# Patient Record
Sex: FEMALE | Race: WHITE | Employment: FULL TIME | URBAN - NONMETROPOLITAN AREA
[De-identification: names, ages, dates, MRNs, and addresses within clinical notes are randomized per-mention and may not be internally consistent; named-entity substitution may affect disease eponyms.]

---
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
## 2021-03-24 ENCOUNTER — OFFICE VISIT (OUTPATIENT)
Dept: FAMILY MEDICINE CLINIC | Age: 21
End: 2021-03-24
Payer: COMMERCIAL

## 2021-03-24 ENCOUNTER — HOSPITAL ENCOUNTER (EMERGENCY)
Age: 21
Discharge: PSYCHIATRIC OR SUBSTANCE USE FACILITY | End: 2021-03-25
Attending: EMERGENCY MEDICINE
Payer: COMMERCIAL

## 2021-03-24 VITALS
WEIGHT: 152 LBS | TEMPERATURE: 99.5 F | BODY MASS INDEX: 26.09 KG/M2 | RESPIRATION RATE: 20 BRPM | OXYGEN SATURATION: 97 % | SYSTOLIC BLOOD PRESSURE: 118 MMHG | DIASTOLIC BLOOD PRESSURE: 72 MMHG | HEART RATE: 107 BPM

## 2021-03-24 VITALS
SYSTOLIC BLOOD PRESSURE: 112 MMHG | HEART RATE: 88 BPM | OXYGEN SATURATION: 98 % | WEIGHT: 152 LBS | HEIGHT: 66 IN | DIASTOLIC BLOOD PRESSURE: 73 MMHG | BODY MASS INDEX: 24.43 KG/M2 | RESPIRATION RATE: 16 BRPM | TEMPERATURE: 98.7 F

## 2021-03-24 DIAGNOSIS — F41.9 ANXIETY: Primary | ICD-10-CM

## 2021-03-24 DIAGNOSIS — R44.3 HALLUCINATIONS: ICD-10-CM

## 2021-03-24 DIAGNOSIS — R44.0 AUDITORY HALLUCINATIONS: Primary | ICD-10-CM

## 2021-03-24 DIAGNOSIS — R44.1 VISUAL HALLUCINATIONS: ICD-10-CM

## 2021-03-24 LAB
ACETAMINOPHEN LEVEL: <5 UG/ML (ref 15–30)
ALBUMIN SERPL-MCNC: 4.4 GM/DL (ref 3.4–5)
ALCOHOL SCREEN SERUM: <0.01 %WT/VOL
ALP BLD-CCNC: 52 IU/L (ref 40–129)
ALT SERPL-CCNC: 7 U/L (ref 10–40)
AMPHETAMINES: NEGATIVE
ANION GAP SERPL CALCULATED.3IONS-SCNC: 15 MMOL/L (ref 4–16)
AST SERPL-CCNC: 15 IU/L (ref 15–37)
BARBITURATE SCREEN URINE: NEGATIVE
BASOPHILS ABSOLUTE: 0 K/CU MM
BASOPHILS RELATIVE PERCENT: 0.5 % (ref 0–1)
BENZODIAZEPINE SCREEN, URINE: NEGATIVE
BILIRUB SERPL-MCNC: 0.8 MG/DL (ref 0–1)
BUN BLDV-MCNC: 10 MG/DL (ref 6–23)
CALCIUM SERPL-MCNC: 9.4 MG/DL (ref 8.3–10.6)
CANNABINOID SCREEN URINE: ABNORMAL
CHLORIDE BLD-SCNC: 104 MMOL/L (ref 99–110)
CO2: 20 MMOL/L (ref 21–32)
COCAINE METABOLITE: NEGATIVE
CREAT SERPL-MCNC: 0.8 MG/DL (ref 0.6–1.1)
DIFFERENTIAL TYPE: ABNORMAL
DOSE AMOUNT: ABNORMAL
DOSE AMOUNT: ABNORMAL
DOSE TIME: ABNORMAL
DOSE TIME: ABNORMAL
EOSINOPHILS ABSOLUTE: 0.1 K/CU MM
EOSINOPHILS RELATIVE PERCENT: 0.8 % (ref 0–3)
GFR AFRICAN AMERICAN: >60 ML/MIN/1.73M2
GFR NON-AFRICAN AMERICAN: >60 ML/MIN/1.73M2
GLUCOSE BLD-MCNC: 79 MG/DL (ref 70–99)
HCG QUALITATIVE: NEGATIVE
HCT VFR BLD CALC: 40.6 % (ref 37–47)
HEMOGLOBIN: 13.7 GM/DL (ref 12.5–16)
IMMATURE NEUTROPHIL %: 0.1 % (ref 0–0.43)
LYMPHOCYTES ABSOLUTE: 2 K/CU MM
LYMPHOCYTES RELATIVE PERCENT: 23.7 % (ref 25–45)
MCH RBC QN AUTO: 29.8 PG (ref 27–31)
MCHC RBC AUTO-ENTMCNC: 33.7 % (ref 32–36)
MCV RBC AUTO: 88.3 FL (ref 78–100)
MONOCYTES ABSOLUTE: 0.6 K/CU MM
MONOCYTES RELATIVE PERCENT: 6.7 % (ref 0–4)
OPIATES, URINE: NEGATIVE
OXYCODONE: NEGATIVE
PDW BLD-RTO: 12.1 % (ref 11.7–14.9)
PHENCYCLIDINE, URINE: NEGATIVE
PLATELET # BLD: 223 K/CU MM (ref 140–440)
PMV BLD AUTO: 11.1 FL (ref 7.5–11.1)
POTASSIUM SERPL-SCNC: 3.7 MMOL/L (ref 3.5–5.1)
RBC # BLD: 4.6 M/CU MM (ref 4.2–5.4)
SALICYLATE LEVEL: <0.3 MG/DL (ref 15–30)
SARS-COV-2, NAAT: NOT DETECTED
SEGMENTED NEUTROPHILS ABSOLUTE COUNT: 5.7 K/CU MM
SEGMENTED NEUTROPHILS RELATIVE PERCENT: 68.2 % (ref 34–64)
SODIUM BLD-SCNC: 139 MMOL/L (ref 135–145)
SOURCE: NORMAL
TOTAL IMMATURE NEUTOROPHIL: 0.01 K/CU MM
TOTAL PROTEIN: 6.8 GM/DL (ref 6.4–8.2)
TSH HIGH SENSITIVITY: 1.55 UIU/ML (ref 0.27–4.2)
WBC # BLD: 8.4 K/CU MM (ref 4–10.5)

## 2021-03-24 PROCEDURE — 6360000002 HC RX W HCPCS: Performed by: EMERGENCY MEDICINE

## 2021-03-24 PROCEDURE — G0480 DRUG TEST DEF 1-7 CLASSES: HCPCS

## 2021-03-24 PROCEDURE — G8427 DOCREV CUR MEDS BY ELIG CLIN: HCPCS | Performed by: PEDIATRICS

## 2021-03-24 PROCEDURE — 80053 COMPREHEN METABOLIC PANEL: CPT

## 2021-03-24 PROCEDURE — 1036F TOBACCO NON-USER: CPT | Performed by: PEDIATRICS

## 2021-03-24 PROCEDURE — 85025 COMPLETE CBC W/AUTO DIFF WBC: CPT

## 2021-03-24 PROCEDURE — 87635 SARS-COV-2 COVID-19 AMP PRB: CPT

## 2021-03-24 PROCEDURE — 96372 THER/PROPH/DIAG INJ SC/IM: CPT

## 2021-03-24 PROCEDURE — G8419 CALC BMI OUT NRM PARAM NOF/U: HCPCS | Performed by: PEDIATRICS

## 2021-03-24 PROCEDURE — 84703 CHORIONIC GONADOTROPIN ASSAY: CPT

## 2021-03-24 PROCEDURE — 84443 ASSAY THYROID STIM HORMONE: CPT

## 2021-03-24 PROCEDURE — 96374 THER/PROPH/DIAG INJ IV PUSH: CPT

## 2021-03-24 PROCEDURE — G8484 FLU IMMUNIZE NO ADMIN: HCPCS | Performed by: PEDIATRICS

## 2021-03-24 PROCEDURE — 80307 DRUG TEST PRSMV CHEM ANLYZR: CPT

## 2021-03-24 PROCEDURE — 99285 EMERGENCY DEPT VISIT HI MDM: CPT

## 2021-03-24 PROCEDURE — 99215 OFFICE O/P EST HI 40 MIN: CPT | Performed by: PEDIATRICS

## 2021-03-24 PROCEDURE — 99291 CRITICAL CARE FIRST HOUR: CPT

## 2021-03-24 RX ORDER — HYDROXYZINE HYDROCHLORIDE 25 MG/1
25 TABLET, FILM COATED ORAL DAILY
COMMUNITY

## 2021-03-24 RX ORDER — ZIPRASIDONE MESYLATE 20 MG/ML
10 INJECTION, POWDER, LYOPHILIZED, FOR SOLUTION INTRAMUSCULAR ONCE
Status: COMPLETED | OUTPATIENT
Start: 2021-03-24 | End: 2021-03-24

## 2021-03-24 RX ORDER — LORAZEPAM 2 MG/ML
2 INJECTION INTRAMUSCULAR ONCE
Status: COMPLETED | OUTPATIENT
Start: 2021-03-24 | End: 2021-03-24

## 2021-03-24 RX ADMIN — LORAZEPAM 2 MG: 2 INJECTION INTRAMUSCULAR; INTRAVENOUS at 22:39

## 2021-03-24 RX ADMIN — ZIPRASIDONE MESYLATE 10 MG: 20 INJECTION, POWDER, LYOPHILIZED, FOR SOLUTION INTRAMUSCULAR at 22:38

## 2021-03-24 SDOH — ECONOMIC STABILITY: FOOD INSECURITY: WITHIN THE PAST 12 MONTHS, THE FOOD YOU BOUGHT JUST DIDN'T LAST AND YOU DIDN'T HAVE MONEY TO GET MORE.: PATIENT DECLINED

## 2021-03-24 SDOH — ECONOMIC STABILITY: INCOME INSECURITY: HOW HARD IS IT FOR YOU TO PAY FOR THE VERY BASICS LIKE FOOD, HOUSING, MEDICAL CARE, AND HEATING?: PATIENT DECLINED

## 2021-03-24 ASSESSMENT — PAIN SCALES - GENERAL: PAINLEVEL_OUTOF10: 0

## 2021-03-24 ASSESSMENT — SLEEP AND FATIGUE QUESTIONNAIRES
DIFFICULTY STAYING ASLEEP: YES
DIFFICULTY FALLING ASLEEP: YES
DIFFICULTY ARISING: NO

## 2021-03-24 NOTE — ED PROVIDER NOTES
ADDENDUM:    Care of the patient was assumed  from Dr. Chaparrita Rincon. I have reviewed the notes, assessments, and/or procedures performed, I concur with her/his documentation on [address] [place] [address]. I reviewed the medical record and evaluated the patient. ED COURSE/MDM:  Laboratory and imaging data were reviewed and care plan was arranged with the patient(see separate lab/imaging reports). RADIOLOGY:  Already resulted studies have been reviewed. No orders to display       Labs Reviewed   CBC WITH AUTO DIFFERENTIAL - Abnormal; Notable for the following components:       Result Value    Segs Relative 68.2 (*)     Lymphocytes % 23.7 (*)     Monocytes % 6.7 (*)     All other components within normal limits   COMPREHENSIVE METABOLIC PANEL W/ REFLEX TO MG FOR LOW K - Abnormal; Notable for the following components:    CO2 20 (*)     ALT 7 (*)     All other components within normal limits   URINE DRUG SCREEN - Abnormal; Notable for the following components:    Cannabinoid Scrn, Ur UNCONFIRMED POSITIVE (*)     All other components within normal limits   ACETAMINOPHEN LEVEL - Abnormal; Notable for the following components:    Acetaminophen Level <5.0 (*)     All other components within normal limits   SALICYLATE LEVEL - Abnormal; Notable for the following components:    Salicylate Lvl <2.8 (*)     All other components within normal limits   ETHANOL   TSH WITHOUT REFLEX   HCG, SERUM, QUALITATIVE       Medications - No data to display    Vitals:    03/24/21 1753   BP: 118/85   Pulse: 92   Resp: 16   Temp: 98.7 °F (37.1 °C)   TempSrc: Oral   SpO2: 98%   Weight: 152 lb (68.9 kg)   Height: 5' 6\" (1.676 m)       Patient has been pinked slipped and she represents a risk. She is not suicidal but she has homicidal thoughts. Voices are expressing her to hurt other people. She recognizes this but she states it is getting harder to piece together reality. She requires hospitalization.     Patient is medical clear for evaluation and placement     Patient started complaining of nausea and increasing anxiety. I elected to her geodon and and ativan. She is accepted to [address] [place] [address] care time of 30 minutes was given to this patient which included time at the bedside, discussions with consultants, review of the chart, and lab studies. This critical care time does not include any billable procedures. FINAL IMPRESSION:  1. Auditory hallucinations    2.  Visual hallucinations        New Prescriptions    No medications on file                 Evonnegary Hayes, DO  03/24/21 1811 [facility], DO  03/24/21 127 [facility], DO  03/25/21 3124

## 2021-03-24 NOTE — PROGRESS NOTES
Farshad Escudero (:  2000) is a 24 y.o. female    ASSESSMENT/PLAN:    Anxiety w/ escalating AV hallucination over past 4-6 weeks. No mental status changes in office. No concern for intracranial lesion on exam. Behavioral health likely, cannot r/o tox/substance use, electrolyte imbalance. Concern for progressive symptoms. Prefers crisis evaluation tonight to outpatient workup and referral. Refer to [facility] ED for evaluation and treatment planning. Will follow up after discharge for further workup, medication management, specialist referral.    Discussed w/ [facility] ED. SUBJECTIVE/OBJECTIVE:  HPI    CC: Behavior follow up    Current behavioral diagnoses include anxiety w/ depression. Current medications include zoloft 25mg, vistaril 25mg prn, melatonin  Current behavioral therapy: none    AV hallucination over past 4-6 weeks, escalating in severity. No acute psychosis or mental status change. Sleep disturbance -- 4-6 hours intermittent sleep per night. Affecting work performance. Headaches baseline w/o AM symptoms, vomiting, balance change, other visual disturbance. No loose stool, rash, joint pain. Denies illicit substance, non-prescription med use. COVID+ Oct 2020 -- outpatient rx w/ significant fatigue w/o breathing difficulty, prolonged fever, hallucination    Appetite variable, weight increased over past 4-6 weeks. FH bipolar d/o, anxiety      /72 (Site: Left Upper Arm, Position: Sitting, Cuff Size: Medium Adult)   Pulse 107   Temp 99.5 °F (37.5 °C) (Temporal)   Resp 20   Wt 152 lb (68.9 kg)   LMP  (LMP Unknown) Comment: Has the arm bar- birth control  SpO2 97%   Breastfeeding No   BMI 26.09 kg/m²     Physical Exam  Vitals signs and nursing note reviewed. Constitutional:       Appearance: She is well-developed. HENT:      Head: Normocephalic and atraumatic. Neck:      Musculoskeletal: Normal range of motion and neck supple.    Cardiovascular:      Rate and Rhythm: Normal rate. Heart sounds: Normal heart sounds. Pulmonary:      Effort: Pulmonary effort is normal.   Skin:     Coloration: Skin is not pale. Findings: No rash. Neurological:      Mental Status: She is alert and oriented to person, place, and time. Cranial Nerves: No cranial nerve deficit. Motor: No abnormal muscle tone. Coordination: Coordination normal.   Psychiatric:         Attention and Perception: She is attentive. She perceives auditory and visual hallucinations. Mood and Affect: Mood is not anxious or depressed. Affect is tearful. Affect is not blunt or angry. Speech: Speech normal. Speech is not rapid and pressured, delayed or tangential.         Behavior: Behavior is not agitated, slowed, aggressive, withdrawn or hyperactive. Behavior is cooperative. Thought Content: Thought content is not delusional. Thought content does not include homicidal or suicidal ideation. Thought content does not include homicidal or suicidal plan. Cognition and Memory: Cognition normal.         Judgment: Judgment is not impulsive or inappropriate. An electronic signature was used to authenticate this note.     --Hoang Boyce MD

## 2021-03-24 NOTE — ED NOTES
Pt put in green gown, gripper socks, underwear.     Pt took off tennis shoes, socks, underwear, bra, pants and tshirt were bagged and put in [facility]     Augustina De Paz  03/24/21 4177

## 2021-03-24 NOTE — ED PROVIDER NOTES
Triage Chief Complaint:   Hallucinations (states has been hearing and seeing things that aren't there. states has been going on for a couple months but worse the past 2 weeks. states the auditory hallucinations are telling her to hurt other people, no one in particular)      [place]:  Bo Sorenson is a 24 y.o. female that presents to the ER with psychosis. She states she is hearing things and seeing things. States it has been going on for a few months but getting worse and more persistent. She states she has a diagnosis of anxiety, depression, PTSD. States her brother is schizophrenic. States she is seeing her dead grandmother, States she is starting to feel like \"she doesn't know what is real or fake anymore. \" She states she is hearing voices telling her not to take her meds, not to seek help, to harm people. She states she has been taking her home meds despite what the voices say. She states she is not suicidal. She states they are telling her to harm people but she has no plans to harm anyone. She states she is scared as she is overwhelemed and feels it is taking over her life. Past Medical History:   Diagnosis Date    Acute gastroenteritis     Allergic     Asthma     Depression     PTSD (post-traumatic stress disorder)      History reviewed. No pertinent surgical history.   Family History   Problem Relation Age of Onset    Heart Disease Mother         heart attacks    High Blood Pressure Mother     Depression Father     High Blood Pressure Father    [facility] Asthma Father     Allergies Father     Down Syndrome Sister     Other Sister         epilepsy;tracheal malasia; hyperthyroidism    Asthma Brother     Allergies Brother     Arrhythmia Sister     Anemia Maternal Grandmother      Social History     Socioeconomic History    Marital status: Single     Spouse name: Not on file    Number of children: Not on file    Years of education: Not on file    Highest education level: Not on file Occupational History    Not on file   Social Needs    Financial resource strain: Patient refused    Food insecurity     Worry: Patient refused     Inability: Patient refused    Transportation needs     Medical: Patient refused     Non-medical: Patient refused   Tobacco Use    Smoking status: Passive Smoke Exposure - Never Smoker    Smokeless tobacco: Never Used   Substance and Sexual Activity    Alcohol use: No    Drug use: Yes     Types: Marijuana    Sexual activity: Yes   Lifestyle    Physical activity     Days per week: Not on file     Minutes per session: Not on file    Stress: Not on file   Relationships    Social connections     Talks on phone: Not on file     Gets together: Not on file     Attends Yarsanism service: Not on file     Active member of club or organization: Not on file     Attends meetings of clubs or organizations: Not on file     Relationship status: Not on file    Intimate partner violence     Fear of current or ex partner: Not on file     Emotionally abused: Not on file     Physically abused: Not on file     Forced sexual activity: Not on file   Other Topics Concern    Not on file   Social History Narrative    Not on file     No current facility-administered medications for this encounter. Current Outpatient Medications   Medication Sig Dispense Refill    hydrOXYzine (ATARAX) 25 MG tablet Take 25 mg by mouth daily      sertraline (ZOLOFT) 25 MG tablet Take 1 tablet by mouth daily 30 tablet 3    ondansetron (ZOFRAN ODT) 4 MG disintegrating tablet Take 1 tablet by mouth every 6 hours as needed for Nausea or Vomiting 10 tablet 0    albuterol sulfate HFA (VENTOLIN HFA) 108 (90 Base) MCG/ACT inhaler Inhale 2 puffs into the lungs every 6 hours as needed for Wheezing (cough) Please dispense with spacer. 1 Inhaler 0     No Known Allergies  Nursing Notes Reviewed    ROS:  At least 10 systems reviewed and otherwise negative except as in the [address]     Physical Exam:  ED Triage Vitals [03/24/21 1753]   Enc Vitals Group      /85      Pulse 92      Resp 16      Temp 98.7 °F (37.1 °C)      Temp Source Oral      SpO2 98 %      Weight 152 lb (68.9 kg)      Height 5' 6\" (1.676 m)      Head Circumference       Peak Flow       Pain Score       Pain Loc       Pain Edu? Excl. in [address]? My pulse oximetry interpretation is which is within the normal range    GENERAL APPEARANCE: Awake and alert. Cooperative. No acute distress. HEAD: Normocephalic. Atraumatic. EYES: EOM's grossly intact. Sclera anicteric. ENT: Mucous membranes are moist. Tolerates saliva. No trismus. NECK: Supple. No meningismus. Trachea midline. HEART: RRR. Radial pulses 2+. LUNGS: Respirations unlabored. CTAB  ABDOMEN: Soft. Non-tender. No guarding or rebound. EXTREMITIES: No acute deformities. SKIN: Warm and dry. NEUROLOGICAL: No gross facial drooping. Moves all 4 extremities spontaneously. PSYCHIATRIC: + auditory and visual hallucinations.  Denies homicidal and suicidal intent or ideation    I have reviewed and interpreted all of the currently available lab results from this visit (if applicable):  Results for orders placed or performed during the hospital encounter of 03/24/21   CBC with Auto Diff   Result Value Ref Range    WBC 8.4 4.0 - 10.5 K/CU MM    RBC 4.60 4.2 - 5.4 M/CU MM    Hemoglobin 13.7 12.5 - 16.0 GM/DL    Hematocrit 40.6 37 - 47 %    MCV 88.3 78 - 100 FL    MCH 29.8 27 - 31 PG    MCHC 33.7 32.0 - 36.0 %    RDW 12.1 11.7 - 14.9 %    Platelets 218 712 - 807 K/CU MM    MPV 11.1 7.5 - 11.1 FL    Differential Type AUTOMATED DIFFERENTIAL     Segs Relative 68.2 (H) 34 - 64 %    Lymphocytes % 23.7 (L) 25 - 45 %    Monocytes % 6.7 (H) 0 - 4 %    Eosinophils % 0.8 0 - 3 %    Basophils % 0.5 0 - 1 %    Segs Absolute 5.7 K/CU MM    Lymphocytes Absolute 2.0 K/CU MM    Monocytes Absolute 0.6 K/CU MM    Eosinophils Absolute 0.1 K/CU MM    Basophils Absolute 0.0 K/CU MM    Immature Neutrophil % 0.1 0 - 0.43 % Total Immature Neutrophil 0.01 K/CU MM        Radiographs:  [] Radiologist's Wet Read Report Reviewed:     [] Discussed with Radiologist:     [] The following radiograph was interpreted by myself in the absence of a radiologist:     EKG: (All EKG's are interpreted by myself in the absence of a cardiologist)      MDM:  Patient calm, cooperative. DO feel she is having active psychosis but is not suicidal nor homicidal. Sitter placed. Pink slip placed. Vitals normal. My shift is ending, signing out labs, MH eval to oncoming provider. 6:54 PM EDT I have signed out [address] Emergency Department care to Dr. Hedy Stone. We discussed the history, physical exam, completed/pending test results (if obtained) and current treatment plan. Please refer to his/her chart for the patients further details, remaining Emergency Department course, final disposition and diagnosis. Clinical Impression:  1. Auditory hallucinations    2. Visual hallucinations        Disposition Vitals:  [unfilled], [unfilled], [unfilled], [unfilled]    Disposition referral (if applicable):  No follow-up provider specified.     Disposition medications (if applicable):  New Prescriptions    No medications on file         (Please note that portions of this note may have been completed with a voice recognition program. Efforts were made to edit the dictations but occasionally words are mis-transcribed.)    MD Rafaela Brown MD  03/24/21 8790

## 2021-03-25 NOTE — ED NOTES
Advised S.O. that visiting hours are over and he will have to say their good nights and leave the ER. Pt is tearful prior to the announcement. Took his name and number to reach him if pt gives us permission. Oj lives with her and his number [phone]. S.O left tearful but cooperative.       Blanca Locke RN  03/24/21 1140

## 2021-03-25 NOTE — ED NOTES
States she is anxious and needs to throw up. To BR, vomited x1 and returned to bed. Dr Laurel Tapia notified of request for medication. I will be the sitter at this point.       Lennox Rink, RN  03/24/21 2542

## 2021-03-25 NOTE — PROGRESS NOTES
Provisional Diagnosis: Anxiety Disorder, per history     Risk, Psychosocial and Contextual Factors:  Family history of mental health     Current MH Treatment:  Denies receiving mental health counseling, receives prescribed medication, Hydroxyzine, Zoloft     Present Suicidal Behavior:      Verbal:  Denies        Attempt: Denies    Access to Weapons:  Denies     Current Suicide Risk: Low, Moderate or High:    Low    Past Suicidal Behavior:       Verbal:  Yes    Attempt: Yes, overdose    Self-Injurious/Self-Mutilation: Yes    Traumatic Event Within Past 2 Weeks:   Denies    Current Abuse: Denies     Legal:   Denies    Violence:  Denies    Protective Factors:  Boyfriend     Housing:    Lives with mom and sister     CPAP/Oxygen/Ambulation Difficulties:     Basic Vital Signs Normal?: Check with Patients Nurse prior to Calling Psychiatry    Critical Labs?: Check with Patients Nurse prior to Calling Psychiatry    Clinical Summary:      Patient is a 24year old female who presents to the [facility] ED voluntarily. Tele-health completed by this clinician via I-pad. Patient reports to this clinician 'I have been hearing and seeing things that are not there'. Patient states 'like today I dropped my pencil and the floor rippled and I saw my dead grandma'. Patient reports an increase in verbal and visual hallucinations the past 2 or 3 months. Patient reports hearing voices telling her to hurt other people and 'lie to the doctor so she does not go to the bad place'. Patient denies a specific plan or intent. Patient denies homicidal ideation towards anyone specific. Patient reports some depression. Patient reports good eating habits but trouble falling asleep. Patient denies suicidal ideation or plan. Patient does report previous thought and attempt 'years ago'. Patient does receive prescribed medication. AOD denied. Patient appears to have good insight. Patient has flat affect throughout assessment.  Patient reports to medical provider family history of schizophrenia. Patient alert and oriented x4. Level of Care Disposition:      Consulted with medical provider. Patient is medically cleared. Patient is placed under a pink slip. COVID swab needs collected at this time.

## 2021-03-25 NOTE — ED NOTES
Pt resting in bed with male visitor at bedside. Sitter is at bedside. Pt is awake and cooperative.       Ciara Triplett RN  03/24/21 2022

## 2021-03-25 NOTE — ED NOTES
[employer] requested and poured into cup. Ice given. Lights turned off per request.  Comfort measures made with pillow and blanket. SR up x1.        Velia Kennedy RN  03/24/21 4391

## 2021-03-25 NOTE — ED NOTES
Pt awakens to voice. Cooperative. Gown changed. States she doesn't have to go to the BR. Transferred to [facility] by [facility]. Belongings sent with ambulance.        Lennox Rink, RN  03/25/21 9469

## 2021-03-30 ENCOUNTER — TELEPHONE (OUTPATIENT)
Dept: FAMILY MEDICINE CLINIC | Age: 21
End: 2021-03-30

## 2021-03-31 ENCOUNTER — OFFICE VISIT (OUTPATIENT)
Dept: FAMILY MEDICINE CLINIC | Age: 21
End: 2021-03-31
Payer: COMMERCIAL

## 2021-03-31 VITALS
SYSTOLIC BLOOD PRESSURE: 118 MMHG | TEMPERATURE: 98.2 F | HEART RATE: 79 BPM | DIASTOLIC BLOOD PRESSURE: 81 MMHG | BODY MASS INDEX: 23.4 KG/M2 | RESPIRATION RATE: 20 BRPM | WEIGHT: 145 LBS | OXYGEN SATURATION: 99 %

## 2021-03-31 DIAGNOSIS — G47.9 SLEEP DISTURBANCE: ICD-10-CM

## 2021-03-31 DIAGNOSIS — F41.9 ANXIETY: Primary | ICD-10-CM

## 2021-03-31 PROCEDURE — G8484 FLU IMMUNIZE NO ADMIN: HCPCS | Performed by: PEDIATRICS

## 2021-03-31 PROCEDURE — G8427 DOCREV CUR MEDS BY ELIG CLIN: HCPCS | Performed by: PEDIATRICS

## 2021-03-31 PROCEDURE — 99214 OFFICE O/P EST MOD 30 MIN: CPT | Performed by: PEDIATRICS

## 2021-03-31 PROCEDURE — G8420 CALC BMI NORM PARAMETERS: HCPCS | Performed by: PEDIATRICS

## 2021-03-31 PROCEDURE — 1036F TOBACCO NON-USER: CPT | Performed by: PEDIATRICS

## 2021-03-31 NOTE — LETTER
[facility]  [place] [address] [place] [id number]  Phone: [phone]  Fax: [phone]    Dain Pete MD        March 31, 2021     Patient: Sandhya Martin   YOB: 2000   Date of Visit: 3/31/2021       To Whom it May Concern:    Arnol Hines was seen in my clinic on 3/31/2021. She may return to school on 4/5/2021. If you have any questions or concerns, please don't hesitate to call.     Sincerely,          Dain Pete MD

## 2021-04-02 NOTE — PROGRESS NOTES
Khushi Arredondo (:  2000) is a 24 y.o. female    ASSESSMENT/PLAN:    Anxiety w/ depressed mood, sleep disturbance. Recent hospitalization for hallucination and concern for psychosis. Symptoms fully resolved. Discharged on zoloft and abilify. Further observation and history suggestive of conversion disorder and stress response. No recurrent symptoms, sleep improved. Marijuana use. Continue zoloft, abilify, prn hydroxyzine and observe closely for medication effectiveness, duration, and side effects of concern. Awaiting referral to [facility]. To investigate counseling sessions locally. Consider referral to Dr. Buckley Click if interested. Discussed substance use risks and treatment options as desired. Return in 1-2 weeks for medication followup and monitoring of growth chart, sooner w/ academic or behavior concerns, immediately w/ safety concerns. SUBJECTIVE/OBJECTIVE:  HPI    CC: Behavior follow up    Current behavioral diagnoses include anxiety w/ depression, sleep disturbance. Current medications include zoloft 50mg, abilify 5mg, hydroxyzine 25mg prn  Current behavioral therapy: none    Recent hospitalization for hallucination and concern for psychosis. Symptoms fully resolved. Discharged on zoloft and abilify. Hallucinations resolved prior to medication initiation in facility. Notes similar behavior changes in boyfriend's brother who has been around often recently. Poor sleep much improved. /81 (Site: Right Upper Arm, Position: Sitting, Cuff Size: Medium Adult)   Pulse 79   Temp 98.2 °F (36.8 °C) (Temporal)   Resp 20   Wt 145 lb (65.8 kg)   LMP 2021 (Exact Date) Comment: Has the arm bar- birth control  SpO2 99%   BMI 23.40 kg/m²     Physical Exam  Vitals signs and nursing note reviewed. Constitutional:       Appearance: She is well-developed. HENT:      Head: Normocephalic and atraumatic.    Neck:      Musculoskeletal: Normal range of motion and neck supple. Cardiovascular:      Rate and Rhythm: Normal rate. Heart sounds: Normal heart sounds. Pulmonary:      Effort: Pulmonary effort is normal.   Skin:     Coloration: Skin is not pale. Findings: No rash. Neurological:      Mental Status: She is alert and oriented to person, place, and time. Cranial Nerves: No cranial nerve deficit. Motor: No abnormal muscle tone. Coordination: Coordination normal.   Psychiatric:         Attention and Perception: She is attentive. Mood and Affect: Mood is anxious. Mood is not depressed. Affect is labile. Affect is not blunt or angry. Speech: Speech normal. Speech is not rapid and pressured or delayed. Behavior: Behavior is not agitated, slowed, aggressive, withdrawn or hyperactive. Behavior is cooperative. Thought Content: Thought content does not include homicidal or suicidal ideation. Thought content does not include homicidal or suicidal plan. Judgment: Judgment is not impulsive or inappropriate. An electronic signature was used to authenticate this note.     --Hardy Kc MD

## 2021-05-04 NOTE — TELEPHONE ENCOUNTER
Called and spoke with patient. She states she has not received a call from [facility] yet, but does need a refill on her Zoloft 50mg and her Abilify 5mg.

## 2021-05-04 NOTE — TELEPHONE ENCOUNTER
Pt states she visited an outpatient office and her Zoloft was increased to 50mg as well as starting the 5mg of Abilify

## 2021-05-05 RX ORDER — ARIPIPRAZOLE 5 MG/1
5 TABLET ORAL DAILY
Qty: 30 TABLET | Refills: 0 | Status: SHIPPED | OUTPATIENT
Start: 2021-05-05 | End: 2021-06-04 | Stop reason: SDUPTHER

## 2021-05-07 ENCOUNTER — OFFICE VISIT (OUTPATIENT)
Dept: FAMILY MEDICINE CLINIC | Age: 21
End: 2021-05-07
Payer: COMMERCIAL

## 2021-05-07 VITALS
BODY MASS INDEX: 26.31 KG/M2 | RESPIRATION RATE: 16 BRPM | DIASTOLIC BLOOD PRESSURE: 68 MMHG | SYSTOLIC BLOOD PRESSURE: 100 MMHG | HEART RATE: 86 BPM | WEIGHT: 163 LBS | TEMPERATURE: 98.6 F

## 2021-05-07 DIAGNOSIS — F41.9 ANXIETY: Primary | ICD-10-CM

## 2021-05-07 DIAGNOSIS — G47.9 SLEEP DISTURBANCE: ICD-10-CM

## 2021-05-07 PROCEDURE — G8419 CALC BMI OUT NRM PARAM NOF/U: HCPCS | Performed by: PEDIATRICS

## 2021-05-07 PROCEDURE — 1036F TOBACCO NON-USER: CPT | Performed by: PEDIATRICS

## 2021-05-07 PROCEDURE — 99213 OFFICE O/P EST LOW 20 MIN: CPT | Performed by: PEDIATRICS

## 2021-05-07 PROCEDURE — G8427 DOCREV CUR MEDS BY ELIG CLIN: HCPCS | Performed by: PEDIATRICS

## 2021-05-09 NOTE — PROGRESS NOTES
Anne Miranda (:  2000) is a 24 y.o. female    ASSESSMENT/PLAN:    Depression w/ anxiety, possible bipolar disorder. Symptoms stable on current medication and therapy regimen w/o side effects of concern. No safety concerns. Continue abilify and zoloft and observe closely for medication effectiveness, duration, and side effects of concern. Considering counseling options. Intake w/ L[facility] Boggs scheduled next week. Return in 1-4 weeks for medication followup and monitoring of growth chart, sooner w/ academic or behavior concerns, immediately w/ safety concerns. SUBJECTIVE/OBJECTIVE:  HPI    CC: Behavior follow up    Current behavioral diagnoses include mood disorder w/ anxiety. Current medications include abilify 5mg, zoloft 50mg  Current behavioral therapy: none    No concerns w/ current behavioral health medications and progress. Mood, sleep, anxiety much improved since last visit. No headache, abdominal pain, abnormal movements, mood changes, aggressive behavior. Sleep stable. Appetite stable. No significant weight change. No safety concerns today. Denies thoughts of self harm or harm to others. Caregiver has no medical concerns today. /68 (Site: Left Upper Arm, Position: Sitting, Cuff Size: Medium Adult)   Pulse 86   Temp 98.6 °F (37 °C) (Temporal)   Resp 16   Wt 163 lb (73.9 kg)   BMI 26.31 kg/m²     Physical Exam  Vitals signs and nursing note reviewed. Constitutional:       Appearance: She is well-developed. HENT:      Head: Normocephalic and atraumatic. Neck:      Musculoskeletal: Normal range of motion and neck supple. Cardiovascular:      Rate and Rhythm: Normal rate. Heart sounds: Normal heart sounds. Pulmonary:      Effort: Pulmonary effort is normal.   Skin:     Coloration: Skin is not pale. Findings: No rash. Neurological:      Mental Status: She is alert and oriented to person, place, and time.       Cranial Nerves: No cranial nerve deficit. Motor: No abnormal muscle tone. Coordination: Coordination normal.   Psychiatric:         Attention and Perception: She is attentive. Mood and Affect: Mood is not anxious or depressed. Affect is not blunt or angry. Speech: Speech normal.         Behavior: Behavior is not agitated, slowed, aggressive, withdrawn or hyperactive. Thought Content: Thought content does not include homicidal or suicidal ideation. Judgment: Judgment is not impulsive or inappropriate. An electronic signature was used to authenticate this note.     --Cheko Sullivan MD

## 2021-06-04 RX ORDER — ARIPIPRAZOLE 5 MG/1
5 TABLET ORAL DAILY
Qty: 30 TABLET | Refills: 0 | Status: SHIPPED | OUTPATIENT
Start: 2021-06-04

## 2021-07-09 ENCOUNTER — OFFICE VISIT (OUTPATIENT)
Dept: FAMILY MEDICINE CLINIC | Age: 21
End: 2021-07-09
Payer: COMMERCIAL

## 2021-07-09 VITALS
SYSTOLIC BLOOD PRESSURE: 109 MMHG | WEIGHT: 172 LBS | RESPIRATION RATE: 16 BRPM | BODY MASS INDEX: 27.76 KG/M2 | HEART RATE: 89 BPM | TEMPERATURE: 98.6 F | DIASTOLIC BLOOD PRESSURE: 83 MMHG

## 2021-07-09 DIAGNOSIS — N91.2 AMENORRHEA: ICD-10-CM

## 2021-07-09 DIAGNOSIS — R10.30 LOWER ABDOMINAL PAIN: Primary | ICD-10-CM

## 2021-07-09 LAB
CONTROL: NORMAL
GONADOTROPIN, CHORIONIC (HCG) QUANT: <5 MIU/ML
HCG QUALITATIVE: NEGATIVE
PREGNANCY TEST URINE, POC: NEGATIVE

## 2021-07-09 PROCEDURE — 81025 URINE PREGNANCY TEST: CPT | Performed by: PEDIATRICS

## 2021-07-09 PROCEDURE — G8427 DOCREV CUR MEDS BY ELIG CLIN: HCPCS | Performed by: PEDIATRICS

## 2021-07-09 PROCEDURE — G8419 CALC BMI OUT NRM PARAM NOF/U: HCPCS | Performed by: PEDIATRICS

## 2021-07-09 PROCEDURE — 99214 OFFICE O/P EST MOD 30 MIN: CPT | Performed by: PEDIATRICS

## 2021-07-09 PROCEDURE — 1036F TOBACCO NON-USER: CPT | Performed by: PEDIATRICS

## 2021-07-12 NOTE — PROGRESS NOTES
Dontae Reyes (:  2000) is a 22 y.o. female    ASSESSMENT/PLAN:    Abdominal pain w/ positive home pregnancy tests. Exam reassuring. UA negative. HCG negative, serum HCG not detected. Not consistent w/ acute abdomen, serious bacterial illness. Symptomatic care, symptom diary. Discussed dietary modifications, hydration, sleep hygiene. Continued observation and immediate follow up w/ change in location/character of pain, poor appetite, fever, recurrent vomiting or loose stool, development of joint pain, rash, other new symptoms. SUBJECTIVE/OBJECTIVE:  HPI    CC: Abdominal pain    Length of symptoms intermittent over weeks    Location / character lower abdominal, periumbilical    Concern for positive pregnancy tests at home -- has nexplanon implant    Fever n  Vomiting n  Loose stool n  Anorexia n  Dysuria n   Rash n  Myalgia / fatigue y  Headache n    Change in menses n  Urethral / vaginal discharge n    Recent stressors y  Mood / anxiety concerns n    Ill contacts n  Known COVID+ contact n    Some improvement w/ OTC medications    /83 (Site: Left Upper Arm, Position: Sitting, Cuff Size: Medium Adult)   Pulse 89   Temp 98.6 °F (37 °C) (Temporal)   Resp 16   Wt 172 lb (78 kg)   BMI 27.76 kg/m²     Physical Exam  Vitals and nursing note reviewed. Constitutional:       Appearance: She is well-developed. She is not ill-appearing or toxic-appearing. HENT:      Right Ear: Tympanic membrane normal.      Left Ear: Tympanic membrane normal.      Nose: No congestion or rhinorrhea. Mouth/Throat:      Mouth: Mucous membranes are moist.      Pharynx: No oropharyngeal exudate or posterior oropharyngeal erythema. Eyes:      General:         Right eye: No discharge. Left eye: No discharge. Extraocular Movements: Extraocular movements intact. Conjunctiva/sclera: Conjunctivae normal.      Pupils: Pupils are equal, round, and reactive to light.    Cardiovascular:      Rate and Rhythm: Normal rate and regular rhythm. Pulses: Normal pulses. Heart sounds: Normal heart sounds. Pulmonary:      Effort: Pulmonary effort is normal. No respiratory distress. Breath sounds: Normal breath sounds. No stridor. No wheezing, rhonchi or rales. Abdominal:      General: Bowel sounds are normal. There is no distension. Palpations: Abdomen is soft. Tenderness: There is abdominal tenderness (diffuse mild discomfort during exam). There is no right CVA tenderness, left CVA tenderness, guarding or rebound. Negative signs include Mir's sign, McBurney's sign and psoas sign. Hernia: No hernia is present. Musculoskeletal:         General: No swelling or tenderness. Normal range of motion. Cervical back: Normal range of motion and neck supple. Right lower leg: No edema. Left lower leg: No edema. Comments: No joint erythema, swelling, tenderness. FROM upper and lower extremities, including shoulder, elbow, wrist, hip, knee, ankle, small joints of hands/feet. Lymphadenopathy:      Cervical: No cervical adenopathy. Skin:     General: Skin is warm. Capillary Refill: Capillary refill takes less than 2 seconds. Coloration: Skin is not jaundiced or pale. Findings: No erythema or rash. Neurological:      General: No focal deficit present. Mental Status: She is alert. Mental status is at baseline. Cranial Nerves: No cranial nerve deficit. Motor: No abnormal muscle tone. Coordination: Coordination normal.      Gait: Gait normal.               An electronic signature was used to authenticate this note.     --Ramya Marrero MD

## 2022-01-05 ENCOUNTER — HOSPITAL ENCOUNTER (EMERGENCY)
Age: 22
Discharge: HOME | End: 2022-01-05
Attending: EMERGENCY MEDICINE
Payer: COMMERCIAL

## 2022-01-05 VITALS
RESPIRATION RATE: 15 BRPM | SYSTOLIC BLOOD PRESSURE: 133 MMHG | OXYGEN SATURATION: 99 % | TEMPERATURE: 98.1 F | HEIGHT: 64 IN | BODY MASS INDEX: 29.71 KG/M2 | HEART RATE: 66 BPM | DIASTOLIC BLOOD PRESSURE: 78 MMHG | WEIGHT: 174 LBS

## 2022-01-05 DIAGNOSIS — F41.1 ANXIETY STATE: Primary | ICD-10-CM

## 2022-01-05 PROCEDURE — 6370000000 HC RX 637 (ALT 250 FOR IP): Performed by: EMERGENCY MEDICINE

## 2022-01-05 PROCEDURE — 99285 EMERGENCY DEPT VISIT HI MDM: CPT

## 2022-01-05 RX ORDER — LORAZEPAM 0.5 MG/1
0.5 TABLET ORAL ONCE
Status: COMPLETED | OUTPATIENT
Start: 2022-01-05 | End: 2022-01-05

## 2022-01-05 RX ADMIN — LORAZEPAM 0.5 MG: 0.5 TABLET ORAL at 12:20

## 2022-01-05 NOTE — ED PROVIDER NOTES
Emergency Department Encounter  Location: [facility] EMERGENCY DEPARTMENT    Patient: Fabi Escobar  MRN: [id number]  : 2000  Date of evaluation: 2022  ED Provider: Grace Dominguez DO, FACEP    Chief Complaint:    Panic Attack    [place]:  Fabi Escobar is a 22 y.o. female that presents to the emergency department with complaints of an anxiety attack. The patient states she was in the parking lot of her new job and had an anxiety attack. She states she is been under a lot of stress lately since her dog was hit by a car recently the new job and some other issues. Patient does have a history of anxiety. She is complaining of feeling her heart's racing and going to beat out of her chest and feeling very jittery. She has been on Abilify for this. She was taken off Abilify and was started on hydroxyzine and Zoloft. She states that Zoloft was increased once. This is being handled by her primary caregiver. She states that she had a hospice little visit in March for similar problem. She states it has been several months and she has had an anxiety attack. ROS:  At least 4 systems reviewed and otherwise acutely negative except as in the [address] Negative for fever or chills  Negative for chest pain  Negative for shortness of breath  Negative for nausea vomiting diarrhea or constipation    Past Medical History:   Diagnosis Date    Acute gastroenteritis     Allergic     Anxiety     Asthma     Depression     Panic attack     PTSD (post-traumatic stress disorder)      History reviewed. No pertinent surgical history.   Family History   Problem Relation Age of Onset    Heart Disease Mother         heart attacks    High Blood Pressure Mother     Depression Father     High Blood Pressure Father    Lalo Chengdy Asthma Father     Allergies Father     Down Syndrome Sister     Other Sister         epilepsy;tracheal malasia; hyperthyroidism    Asthma Brother     Allergies Brother     Arrhythmia Sister  Anemia Maternal Grandmother      Social History     Socioeconomic History    Marital status: Single     Spouse name: Not on file    Number of children: Not on file    Years of education: Not on file    Highest education level: Not on file   Occupational History    Not on file   Tobacco Use    Smoking status: Passive Smoke Exposure - Never Smoker    Smokeless tobacco: Never Used   Vaping Use    Vaping Use: Never used   Substance and Sexual Activity    Alcohol use: No    Drug use: Not Currently     Types: Marijuana Zainab Gayle)    Sexual activity: Yes   Other Topics Concern    Not on file   Social History Narrative    Not on file     Social Determinants of Health     Financial Resource Strain: Unknown    Difficulty of Paying Living Expenses: Patient refused   Food Insecurity: Unknown    Worried About Running Out of Food in the Last Year: Patient refused   [address] in the Last Year: Patient refused   Transportation Needs: Unknown    Lack of Transportation (Medical): Patient refused    Lack of Transportation (Non-Medical): Patient refused   Physical Activity:     Days of Exercise per Week: Not on file   [employer] of Exercise per Session: Not on file   Stress:     Feeling of Stress : Not on file   Social Connections:     Frequency of Communication with Friends and Family: Not on file    Frequency of Social Gatherings with Friends and Family: Not on file    Attends Sikhism Services: Not on file    Active Member of [address] [place] [address] [email or web address] or Organizations: Not on file    Attends Club or Organization Meetings: Not on file    Marital Status: Not on file   Intimate Partner Violence:     Fear of Current or Ex-Partner: Not on file    Emotionally Abused: Not on file    Physically Abused: Not on file    Sexually Abused: Not on file   Housing Stability:     Unable to Pay for Housing in the Last Year: Not on file    Number of [place] in the Last Year: Not on file    Unstable Housing in the Last Year: orders to display       ED Course and MDM:  Here in the ER the patient was given 0.5 mg of Ativan. She states that her doctor could see her at 1 PM this afternoon. She is given the Ativan and is discharged to follow-up with her primary caregiver for med adjustment if necessary. She is instructed return if her condition worsens and she is discharged stable condition at this time. Final Impression:  1. Anxiety state      DISPOSITION Discharge - Pending Orders Complete    Patient referred to:   BRENDA Alanis - SERENTIY  [address] [facility]  [phone]    Go today  For follow up    Discharge medications:  Current Discharge Medication List        (Please note that portions of this note may have been completed with a voice recognition program. Efforts were made to edit the dictations but occasionally words are mis-transcribed.)    Declan Torres DO, [address] [facility]  Board certified in [address]  01/05/22 [address] [facility], [facility]  01/05/22 1218

## 2022-01-05 NOTE — Clinical Note
Dedra Irving was seen and treated in our emergency department on 1/5/2022. She may return to work on 01/06/2022. If you have any questions or concerns, please don't hesitate to call.       Katie Herrera, DO

## 2022-01-05 NOTE — ED TRIAGE NOTES
To the ED with C/O 3 panic attacks,.  Patient states she started a new job today and had 3 panic attacks in the parking lot of her new job

## 2022-07-29 ENCOUNTER — TELEPHONE (OUTPATIENT)
Dept: FAMILY MEDICINE CLINIC | Age: 22
End: 2022-07-29

## 2022-07-29 NOTE — TELEPHONE ENCOUNTER
Pt. Has been in ER three times this week for lower stomach pain. Pt. Has a cist that has ruptured on left ovary. Pt. Has blood in abdominal cavity. Pt. Has been vomiting blood which is why she went to ER today. White blood cell count in urine has doubled since Wed. Pt. Feels she is just being pumped with medication and nothing is being   Resolved. Also  Ketones from 5 to 80 from yesterday to today. Pt.  Would like for Dr. Amira Parks to look into her results from [facility] for labs and ultrasounds and please give her a call ASAP

## 2022-12-07 ENCOUNTER — HOSPITAL ENCOUNTER (EMERGENCY)
Age: 22
Discharge: HOME | End: 2022-12-07
Attending: EMERGENCY MEDICINE
Payer: COMMERCIAL

## 2022-12-07 ENCOUNTER — APPOINTMENT (OUTPATIENT)
Dept: GENERAL RADIOLOGY | Age: 22
End: 2022-12-07
Payer: COMMERCIAL

## 2022-12-07 VITALS
DIASTOLIC BLOOD PRESSURE: 63 MMHG | HEART RATE: 67 BPM | HEIGHT: 65 IN | SYSTOLIC BLOOD PRESSURE: 131 MMHG | RESPIRATION RATE: 14 BRPM | TEMPERATURE: 99.1 F | WEIGHT: 143 LBS | OXYGEN SATURATION: 96 % | BODY MASS INDEX: 23.82 KG/M2

## 2022-12-07 DIAGNOSIS — M25.561 ACUTE PAIN OF RIGHT KNEE: Primary | ICD-10-CM

## 2022-12-07 PROCEDURE — 73562 X-RAY EXAM OF KNEE 3: CPT

## 2022-12-07 PROCEDURE — 99283 EMERGENCY DEPT VISIT LOW MDM: CPT

## 2022-12-07 RX ORDER — METHYLPREDNISOLONE 4 MG/1
TABLET ORAL
Qty: 1 KIT | Refills: 0 | Status: SHIPPED | OUTPATIENT
Start: 2022-12-07

## 2022-12-07 ASSESSMENT — PAIN DESCRIPTION - FREQUENCY: FREQUENCY: CONTINUOUS

## 2022-12-07 ASSESSMENT — PAIN DESCRIPTION - PAIN TYPE: TYPE: ACUTE PAIN

## 2022-12-07 ASSESSMENT — PAIN SCALES - GENERAL: PAINLEVEL_OUTOF10: 5

## 2022-12-07 ASSESSMENT — PAIN - FUNCTIONAL ASSESSMENT: PAIN_FUNCTIONAL_ASSESSMENT: 0-10

## 2022-12-07 ASSESSMENT — PAIN DESCRIPTION - LOCATION: LOCATION: KNEE

## 2022-12-07 ASSESSMENT — PAIN DESCRIPTION - ORIENTATION: ORIENTATION: RIGHT

## 2022-12-07 ASSESSMENT — PAIN DESCRIPTION - DESCRIPTORS: DESCRIPTORS: SHOOTING;SHARP;STABBING

## 2022-12-07 NOTE — ED PROVIDER NOTES
Emergency Department Encounter  Location: [facility] [address] [place] [address]    Patient: Julia Jones  MRN: [id number]  : 2000  Date of evaluation: 2022  ED Provider: Mercy Clark DO, FACEP    Chief Complaint:    Knee Pain ( is having problems with her right knee. States was tender for a couple days. Worse today. States feels like the two bones are touching each other. States has taken ibuprofen for pain-last dose this morning. States had to call off work today. )    [place]:  Julia Jones is a 25 y.o. female that presents to the emergency department complaints of pain to her right knee. The patient states for the past week she has been having pain in her right knee. She states she has been working at [employer] and has been on her feet for 10 or 12 hours a day. She is complaining of a feeling in her right knee that she states feels like her bone is rubbing against her bone. States the pain is mostly in the posterior portion of her right lower extremity and radiating down into her calf. She had to call off work today because of her pain. She describes it as 5 out of 10 and worse with ambulation. She denies any specific injury recently or any chronic injury to her knees. ROS:  At least 4 systems reviewed and otherwise acutely negative except as in the [address] Negative for fever or chills  Negative for chest pain  Negative for shortness of breath  Negative for nausea vomiting diarrhea or constipation    Past Medical History:   Diagnosis Date    Acute gastroenteritis     Allergic     Anxiety     Asthma     Depression     Panic attack     PTSD (post-traumatic stress disorder)      History reviewed. No pertinent surgical history.   Family History   Problem Relation Age of Onset    Heart Disease Mother         heart attacks    High Blood Pressure Mother     Depression Father     High Blood Pressure Father     Asthma Father     Allergies Father     Down Syndrome Sister     Other Sister         epilepsy;tracheal malasia; hyperthyroidism    Asthma Brother     Allergies Brother     Arrhythmia Sister     Anemia Maternal Grandmother      Social History     Socioeconomic History    Marital status: Single     Spouse name: Not on file    Number of children: Not on file    Years of education: Not on file    Highest education level: Not on file   Occupational History    Not on file   Tobacco Use    Smoking status: Never     Passive exposure: Yes    Smokeless tobacco: Never   Vaping Use    Vaping Use: Every day    Substances: Nicotine, Flavoring   Substance and Sexual Activity    Alcohol use: Yes     Comment: Rare    Drug use: Not Currently     Types: Marijuana [place])    Sexual activity: Yes   Other Topics Concern    Not on file   Social History Narrative    Not on file     Social Determinants of Health     Financial Resource Strain: Not on file   Food Insecurity: Not on file   Transportation Needs: Not on file   Physical Activity: Not on file   Stress: Not on file   Social Connections: Not on file   Intimate Partner Violence: Not on file   Housing Stability: Not on file     No current facility-administered medications for this encounter. Current Outpatient Medications   Medication Sig Dispense Refill    methylPREDNISolone (MEDROL, PEDRO LUIS,) 4 MG tablet Take by mouth.  1 kit 0    ARIPiprazole (ABILIFY) 5 MG tablet Take 1 tablet by mouth daily (Patient not taking: No sig reported) 30 tablet 0    sertraline (ZOLOFT) 50 MG tablet Take 1 tablet by mouth daily (Patient not taking: Reported on 12/7/2022) 30 tablet 0    hydrOXYzine (ATARAX) 25 MG tablet Take 25 mg by mouth daily      ondansetron (ZOFRAN ODT) 4 MG disintegrating tablet Take 1 tablet by mouth every 6 hours as needed for Nausea or Vomiting (Patient not taking: No sig reported) 10 tablet 0    albuterol sulfate HFA (VENTOLIN HFA) 108 (90 Base) MCG/ACT inhaler Inhale 2 puffs into the lungs every 6 hours as needed for Wheezing (cough) Please dispense with spacer. (Patient not taking: Reported on 12/7/2022) 1 Inhaler 0     No Known Allergies  Nursing Notes Reviewed    Physical Exam:  ED Triage Vitals [12/07/22 1249]   Enc Vitals Group      /63      Heart Rate 67      Resp 14      Temp 99.1 °F (37.3 °C)      Temp Source Oral      SpO2 96 %      Weight 143 lb (64.9 kg)      Height 5' 5\" (1.651 m)      Head Circumference       Peak Flow       Pain Score       Pain Loc       Pain Edu? Excl. in [address]? GENERAL APPEARANCE: Awake and alert. Cooperative. No acute distress. Nontoxic in appearance  HEAD: Normocephalic. Atraumatic. EYES: Sclera anicteric. ENT: Tolerates saliva. NECK: Supple. Trachea midline. LUNGS: Respirations unlabored. EXTREMITIES: No acute deformities. Focused examination of her right knee reveals a stable joint. There is slight tenderness to palpation in the popliteal fossa but no swelling is noted in that area. The joint is stable. She has some tenderness to palpation of the medial collateral ligament region. She has good pulses present in her right lower extremity. SKIN: Warm and dry. NEUROLOGICAL: No gross facial drooping. PSYCHIATRIC: Normal mood. Labs:  No results found for this visit on 12/07/22. Radiographs (if obtained):  [] The following radiograph was interpreted by myself in the absence of a radiologist:  [x] Radiologist's Report reviewed at time of ED visit:  XR KNEE RIGHT (3 VIEWS)   Final Result   1. No acute osseous abnormality of the right knee. ED Course and MDM:  Patient's x-ray is negative. She will be placed in an Ace wrap and started on Medrol Dosepak. She will be referred to Dr. Phan Cabral for reevaluation of her knee. She will be discharged stable condition is instructed return for any worsening signs and symptoms. She is discharged stable condition at this time. Final Impression:  1.  Acute pain of right knee      DISPOSITION Discharge - Pending Orders Complete    Patient referred to:  Elma Santana DO  [address] [facility] [address] [place] [address] [address]  [phone]    Schedule an appointment as soon as possible for a visit in 1 week  For follow up    Discharge medications:  New Prescriptions    METHYLPREDNISOLONE (MEDROL, PEDRO LUIS,) 4 MG TABLET    Take by mouth.      (Please note that portions of this note may have been completed with a voice recognition program. Efforts were made to edit the dictations but occasionally words are mis-transcribed.)    Tanya Vega DO, [facility]  Board certified in [address] [place] [address]  12/07/22 1411

## 2022-12-07 NOTE — DISCHARGE INSTRUCTIONS
Use medication as directed. You may use Tylenol in addition to the Medrol dose pack for pain. Follow-up with Dr. Tiff Aguilar soon as possible.

## 2022-12-07 NOTE — Clinical Note
Pernell Martin was seen and treated in our emergency department on 12/7/2022. She may return to work on 12/08/2022. If you have any questions or concerns, please don't hesitate to call.       Enid Sewell, DO

## 2022-12-14 ENCOUNTER — OFFICE VISIT (OUTPATIENT)
Dept: ORTHOPEDIC SURGERY | Age: 22
End: 2022-12-14
Payer: COMMERCIAL

## 2022-12-14 VITALS
OXYGEN SATURATION: 97 % | SYSTOLIC BLOOD PRESSURE: 109 MMHG | DIASTOLIC BLOOD PRESSURE: 75 MMHG | HEIGHT: 65 IN | BODY MASS INDEX: 23.8 KG/M2 | HEART RATE: 103 BPM

## 2022-12-14 DIAGNOSIS — S83.511A SPRAIN OF ANTERIOR CRUCIATE LIGAMENT OF RIGHT KNEE, INITIAL ENCOUNTER: Primary | ICD-10-CM

## 2022-12-14 PROCEDURE — G8484 FLU IMMUNIZE NO ADMIN: HCPCS | Performed by: ORTHOPAEDIC SURGERY

## 2022-12-14 PROCEDURE — G8420 CALC BMI NORM PARAMETERS: HCPCS | Performed by: ORTHOPAEDIC SURGERY

## 2022-12-14 PROCEDURE — 99203 OFFICE O/P NEW LOW 30 MIN: CPT | Performed by: ORTHOPAEDIC SURGERY

## 2022-12-14 PROCEDURE — G8427 DOCREV CUR MEDS BY ELIG CLIN: HCPCS | Performed by: ORTHOPAEDIC SURGERY

## 2022-12-14 PROCEDURE — 1036F TOBACCO NON-USER: CPT | Performed by: ORTHOPAEDIC SURGERY

## 2022-12-14 ASSESSMENT — ENCOUNTER SYMPTOMS
CHEST TIGHTNESS: 0
ABDOMINAL PAIN: 0
COLOR CHANGE: 0
BACK PAIN: 0
EYE REDNESS: 0

## 2022-12-14 NOTE — PROGRESS NOTES
Subjective:      Patient ID: Danielle Kenney is a 25 y.o. female. New patient seen in office today for ED FU RT knee pain. No known injury. Posterior knee is location of pain; will run up calf and hamstring. Started about 2 weeks ago; resting, ice, heat to alleviate. Sharp pain in the moment. 10/10 pain level today. X-rays taken in ED. Impression  1. No acute osseous abnormality of the right knee. She comes in today for her first visit with me in regards to her right knee pain. She states that over the past couple of weeks she has been having worsening dull, aching pain primarily along the posterior aspect of the right knee. She also feels like her right knee will hyperextend on her due to instability. She is uncertain as to any injury when she was younger. Patient denies any new injury to the involved extremity/ joint, denies numbness or tingling in the involved extremity and denies fever or chills. Review of Systems   Constitutional:  Negative for activity change, chills and fever. HENT:  Negative for congestion and drooling. Eyes:  Negative for redness. Respiratory:  Negative for chest tightness. Cardiovascular:  Negative for chest pain. Gastrointestinal:  Negative for abdominal pain. Endocrine: Negative for cold intolerance and heat intolerance. Musculoskeletal:  Positive for arthralgias, joint swelling and myalgias. Negative for back pain and gait problem. Skin:  Negative for color change, pallor, rash and wound. Neurological:  Negative for weakness and numbness. Psychiatric/Behavioral:  Negative for confusion. Past Medical History:   Diagnosis Date    Acute gastroenteritis     Allergic     Anxiety     Asthma     Depression     Panic attack     PTSD (post-traumatic stress disorder)        Objective:   Physical Exam  Constitutional:       Appearance: She is well-developed. HENT:      Head: Normocephalic. Nose: No congestion.    Eyes:      Pupils: Pupils are equal, round, and reactive to light. Pulmonary:      Effort: Pulmonary effort is normal.   Musculoskeletal:         General: Swelling present. No tenderness or deformity. Normal range of motion. Cervical back: Normal range of motion. Right hip: Normal.      Left hip: Normal.      Right knee: Swelling present. No deformity, effusion, erythema, ecchymosis, lacerations or bony tenderness. Normal range of motion. No tenderness. No medial joint line, lateral joint line, MCL, LCL or patellar tendon tenderness. ACL laxity present. No LCL laxity or MCL laxity. Normal alignment and normal patellar mobility. Instability Tests: Anterior Lachman test positive. Left knee: Normal. No swelling, deformity, effusion, erythema, ecchymosis, lacerations or bony tenderness. Normal range of motion. No tenderness. No medial joint line, lateral joint line, MCL, LCL or patellar tendon tenderness. No LCL laxity or MCL laxity. Normal alignment and normal patellar mobility. Skin:     General: Skin is warm and dry. Capillary Refill: Capillary refill takes less than 2 seconds. Coloration: Skin is not pale. Findings: No erythema or rash. Neurological:      Mental Status: She is alert and oriented to person, place, and time. Sensory: No sensory deficit. Motor: No weakness. Right knee-Skin intact with no erythema, ecchymosis or lacerations present. 0-140  Mild laxity with anterior Lachman's test      Assessment:      Right knee ACL sprain, possible tear  Right knee pain      Plan:      I discussed with her today her x-ray findings which are normal.  I explained to her that she does have some laxity to her right knee ACL. At this point given her persistent symptoms and with her young age I will order an MRI to evaluate for any possible internal derangement. Following this we may consider physical therapy or possible cortisone injection or surgery if indicated.   I also placed her into a short hinged knee brace to be worn as needed for stability while working or on [employer]. Continue weight-bearing as tolerated. Continue range of motion exercises as instructed. Ice and elevate as needed. Tylenol or Motrin for pain. Follow up after MRI for further evaluation treatment options.             Stacy [address], DO

## 2022-12-14 NOTE — PROGRESS NOTES
New patient seen in office today for ED FU RT knee pain. No known injury. Posterior knee is location of pain; will run up calf and hamstring. Started about 2 weeks ago; resting, ice, heat to alleviate. Sharp pain in the moment. 10/10 pain level today. X-rays taken in ED. Impression   1. No acute osseous abnormality of the right knee.

## 2022-12-14 NOTE — PATIENT INSTRUCTIONS
Continue weight-bearing as tolerated. Continue range of motion exercises as instructed. Ice and elevate as needed. Tylenol or Motrin for pain. MRI ordered today. [facility] will be calling you to set up appointment, if you have not heard from them within a week give them a call [phone]  Follow up in office after MRI is completed.

## 2023-01-22 ENCOUNTER — HOSPITAL ENCOUNTER (OUTPATIENT)
Age: 23
Setting detail: OBSERVATION
Discharge: HOME | End: 2023-01-24
Attending: STUDENT IN AN ORGANIZED HEALTH CARE EDUCATION/TRAINING PROGRAM | Admitting: STUDENT IN AN ORGANIZED HEALTH CARE EDUCATION/TRAINING PROGRAM
Payer: COMMERCIAL

## 2023-01-22 ENCOUNTER — APPOINTMENT (OUTPATIENT)
Dept: GENERAL RADIOLOGY | Age: 23
End: 2023-01-22
Attending: STUDENT IN AN ORGANIZED HEALTH CARE EDUCATION/TRAINING PROGRAM
Payer: COMMERCIAL

## 2023-01-22 ENCOUNTER — HOSPITAL ENCOUNTER (EMERGENCY)
Age: 23
Discharge: ANOTHER HOSPITAL | End: 2023-01-22
Attending: EMERGENCY MEDICINE
Payer: COMMERCIAL

## 2023-01-22 VITALS
HEIGHT: 65 IN | SYSTOLIC BLOOD PRESSURE: 101 MMHG | OXYGEN SATURATION: 100 % | TEMPERATURE: 98 F | WEIGHT: 170 LBS | DIASTOLIC BLOOD PRESSURE: 67 MMHG | RESPIRATION RATE: 21 BRPM | HEART RATE: 81 BPM | BODY MASS INDEX: 28.32 KG/M2

## 2023-01-22 DIAGNOSIS — R19.7 NAUSEA VOMITING AND DIARRHEA: ICD-10-CM

## 2023-01-22 DIAGNOSIS — I49.8 OTHER CARDIAC ARRHYTHMIA: Primary | ICD-10-CM

## 2023-01-22 DIAGNOSIS — R55 VASOVAGAL SYNCOPE: ICD-10-CM

## 2023-01-22 DIAGNOSIS — R11.2 NAUSEA VOMITING AND DIARRHEA: ICD-10-CM

## 2023-01-22 LAB
ALBUMIN SERPL-MCNC: 4.8 GM/DL (ref 3.4–5)
ALP BLD-CCNC: 58 IU/L (ref 40–129)
ALT SERPL-CCNC: 15 U/L (ref 10–40)
AMPHETAMINES: NEGATIVE
ANION GAP SERPL CALCULATED.3IONS-SCNC: 18 MMOL/L (ref 4–16)
AST SERPL-CCNC: 21 IU/L (ref 15–37)
BARBITURATE SCREEN URINE: NEGATIVE
BASOPHILS ABSOLUTE: 0 K/CU MM
BASOPHILS RELATIVE PERCENT: 0.2 % (ref 0–1)
BENZODIAZEPINE SCREEN, URINE: NEGATIVE
BILIRUB SERPL-MCNC: 0.6 MG/DL (ref 0–1)
BILIRUBIN URINE: NEGATIVE MG/DL
BILIRUBIN URINE: NEGATIVE MG/DL
BLOOD, URINE: NEGATIVE
BLOOD, URINE: NEGATIVE
BUN BLDV-MCNC: 14 MG/DL (ref 6–23)
CALCIUM SERPL-MCNC: 9.8 MG/DL (ref 8.3–10.6)
CANNABINOID SCREEN URINE: ABNORMAL
CHLORIDE BLD-SCNC: 104 MMOL/L (ref 99–110)
CHOLESTEROL: 128 MG/DL
CLARITY: CLEAR
CLARITY: CLEAR
CO2: 17 MMOL/L (ref 21–32)
COCAINE METABOLITE: NEGATIVE
COLOR: YELLOW
COLOR: YELLOW
COMMENT UA: ABNORMAL
COMMENT UA: NORMAL
CREAT SERPL-MCNC: 0.8 MG/DL (ref 0.6–1.1)
D DIMER: <200 NG/ML(DDU)
DIFFERENTIAL TYPE: ABNORMAL
EOSINOPHILS ABSOLUTE: 0.1 K/CU MM
EOSINOPHILS RELATIVE PERCENT: 0.9 % (ref 0–3)
ESTIMATED AVERAGE GLUCOSE: 100 MG/DL
GFR SERPL CREATININE-BSD FRML MDRD: >60 ML/MIN/1.73M2
GLUCOSE BLD-MCNC: 149 MG/DL (ref 70–99)
GLUCOSE, URINE: NEGATIVE MG/DL
GLUCOSE, URINE: NEGATIVE MG/DL
HBA1C MFR BLD: 5.1 % (ref 4.2–6.3)
HCG QUALITATIVE: NEGATIVE
HCT VFR BLD CALC: 46.9 % (ref 37–47)
HDLC SERPL-MCNC: 44 MG/DL
HEMOGLOBIN: 15.8 GM/DL (ref 12.5–16)
IMMATURE NEUTROPHIL %: 0.4 % (ref 0–0.43)
INTERPRETATION: NORMAL
KETONES, URINE: 15 MG/DL
KETONES, URINE: NEGATIVE MG/DL
LDL CHOLESTEROL CALCULATED: 74 MG/DL
LEUKOCYTE ESTERASE, URINE: NEGATIVE
LEUKOCYTE ESTERASE, URINE: NEGATIVE
LYMPHOCYTES ABSOLUTE: 1.1 K/CU MM
LYMPHOCYTES RELATIVE PERCENT: 7.4 % (ref 24–44)
MAGNESIUM: 1.8 MG/DL (ref 1.8–2.4)
MCH RBC QN AUTO: 29.4 PG (ref 27–31)
MCHC RBC AUTO-ENTMCNC: 33.7 % (ref 32–36)
MCV RBC AUTO: 87.2 FL (ref 78–100)
MONOCYTES ABSOLUTE: 0.6 K/CU MM
MONOCYTES RELATIVE PERCENT: 4.4 % (ref 0–4)
NITRITE URINE, QUANTITATIVE: NEGATIVE
NITRITE URINE, QUANTITATIVE: NEGATIVE
OPIATES, URINE: NEGATIVE
OXYCODONE: NEGATIVE
PDW BLD-RTO: 12.7 % (ref 11.7–14.9)
PH, URINE: 6.5 (ref 5–8)
PH, URINE: 8 (ref 5–8)
PHENCYCLIDINE, URINE: NEGATIVE
PLATELET # BLD: 311 K/CU MM (ref 140–440)
PMV BLD AUTO: 11.7 FL (ref 7.5–11.1)
POTASSIUM SERPL-SCNC: 4 MMOL/L (ref 3.5–5.1)
PREGNANCY, URINE: NEGATIVE
PRO-BNP: 149.9 PG/ML
PROTEIN UA: NEGATIVE MG/DL
PROTEIN UA: NEGATIVE MG/DL
RBC # BLD: 5.38 M/CU MM (ref 4.2–5.4)
SEGMENTED NEUTROPHILS ABSOLUTE COUNT: 12.6 K/CU MM
SEGMENTED NEUTROPHILS RELATIVE PERCENT: 86.7 % (ref 36–66)
SODIUM BLD-SCNC: 139 MMOL/L (ref 135–145)
SPECIFIC GRAVITY UA: 1.01 (ref 1–1.03)
SPECIFIC GRAVITY UA: 1.02 (ref 1–1.03)
SPECIFIC GRAVITY, URINE: 1.01 (ref 1–1.03)
TOTAL IMMATURE NEUTOROPHIL: 0.06 K/CU MM
TOTAL PROTEIN: 7.6 GM/DL (ref 6.4–8.2)
TRIGL SERPL-MCNC: 50 MG/DL
TROPONIN T: <0.01 NG/ML
TSH HIGH SENSITIVITY: 0.97 UIU/ML (ref 0.27–4.2)
UROBILINOGEN, URINE: 0.2 MG/DL (ref 0.2–1)
UROBILINOGEN, URINE: 0.2 MG/DL (ref 0.2–1)
WBC # BLD: 14.5 K/CU MM (ref 4–10.5)

## 2023-01-22 PROCEDURE — 84703 CHORIONIC GONADOTROPIN ASSAY: CPT

## 2023-01-22 PROCEDURE — 96376 TX/PRO/DX INJ SAME DRUG ADON: CPT

## 2023-01-22 PROCEDURE — 93005 ELECTROCARDIOGRAM TRACING: CPT | Performed by: EMERGENCY MEDICINE

## 2023-01-22 PROCEDURE — 96375 TX/PRO/DX INJ NEW DRUG ADDON: CPT

## 2023-01-22 PROCEDURE — 83735 ASSAY OF MAGNESIUM: CPT

## 2023-01-22 PROCEDURE — 83880 ASSAY OF NATRIURETIC PEPTIDE: CPT

## 2023-01-22 PROCEDURE — 71045 X-RAY EXAM CHEST 1 VIEW: CPT

## 2023-01-22 PROCEDURE — 96374 THER/PROPH/DIAG INJ IV PUSH: CPT

## 2023-01-22 PROCEDURE — 96361 HYDRATE IV INFUSION ADD-ON: CPT

## 2023-01-22 PROCEDURE — 6360000002 HC RX W HCPCS: Performed by: EMERGENCY MEDICINE

## 2023-01-22 PROCEDURE — 99285 EMERGENCY DEPT VISIT HI MDM: CPT

## 2023-01-22 PROCEDURE — 81003 URINALYSIS AUTO W/O SCOPE: CPT

## 2023-01-22 PROCEDURE — G0378 HOSPITAL OBSERVATION PER HR: HCPCS

## 2023-01-22 PROCEDURE — G0379 DIRECT REFER HOSPITAL OBSERV: HCPCS

## 2023-01-22 PROCEDURE — 2580000003 HC RX 258: Performed by: EMERGENCY MEDICINE

## 2023-01-22 PROCEDURE — 80307 DRUG TEST PRSMV CHEM ANLYZR: CPT

## 2023-01-22 PROCEDURE — 80061 LIPID PANEL: CPT

## 2023-01-22 PROCEDURE — 81025 URINE PREGNANCY TEST: CPT

## 2023-01-22 PROCEDURE — 93005 ELECTROCARDIOGRAM TRACING: CPT | Performed by: STUDENT IN AN ORGANIZED HEALTH CARE EDUCATION/TRAINING PROGRAM

## 2023-01-22 PROCEDURE — 2580000003 HC RX 258: Performed by: STUDENT IN AN ORGANIZED HEALTH CARE EDUCATION/TRAINING PROGRAM

## 2023-01-22 PROCEDURE — 84443 ASSAY THYROID STIM HORMONE: CPT

## 2023-01-22 PROCEDURE — 80053 COMPREHEN METABOLIC PANEL: CPT

## 2023-01-22 PROCEDURE — 85379 FIBRIN DEGRADATION QUANT: CPT

## 2023-01-22 PROCEDURE — 84484 ASSAY OF TROPONIN QUANT: CPT

## 2023-01-22 PROCEDURE — 83036 HEMOGLOBIN GLYCOSYLATED A1C: CPT

## 2023-01-22 PROCEDURE — 85025 COMPLETE CBC W/AUTO DIFF WBC: CPT

## 2023-01-22 PROCEDURE — 96360 HYDRATION IV INFUSION INIT: CPT

## 2023-01-22 RX ORDER — POLYETHYLENE GLYCOL 3350 17 G/17G
17 POWDER, FOR SOLUTION ORAL DAILY PRN
Status: DISCONTINUED | OUTPATIENT
Start: 2023-01-22 | End: 2023-01-24 | Stop reason: HOSPADM

## 2023-01-22 RX ORDER — DIPHENHYDRAMINE HYDROCHLORIDE 50 MG/ML
50 INJECTION INTRAMUSCULAR; INTRAVENOUS ONCE
Status: COMPLETED | OUTPATIENT
Start: 2023-01-22 | End: 2023-01-22

## 2023-01-22 RX ORDER — ACETAMINOPHEN 650 MG/1
650 SUPPOSITORY RECTAL EVERY 6 HOURS PRN
Status: DISCONTINUED | OUTPATIENT
Start: 2023-01-22 | End: 2023-01-24 | Stop reason: HOSPADM

## 2023-01-22 RX ORDER — SODIUM CHLORIDE 0.9 % (FLUSH) 0.9 %
5-40 SYRINGE (ML) INJECTION EVERY 12 HOURS SCHEDULED
Status: DISCONTINUED | OUTPATIENT
Start: 2023-01-22 | End: 2023-01-24 | Stop reason: HOSPADM

## 2023-01-22 RX ORDER — ACETAMINOPHEN 325 MG/1
650 TABLET ORAL EVERY 6 HOURS PRN
Status: DISCONTINUED | OUTPATIENT
Start: 2023-01-22 | End: 2023-01-24 | Stop reason: HOSPADM

## 2023-01-22 RX ORDER — PROCHLORPERAZINE EDISYLATE 5 MG/ML
10 INJECTION INTRAMUSCULAR; INTRAVENOUS ONCE
Status: COMPLETED | OUTPATIENT
Start: 2023-01-22 | End: 2023-01-22

## 2023-01-22 RX ORDER — SODIUM CHLORIDE, SODIUM LACTATE, POTASSIUM CHLORIDE, CALCIUM CHLORIDE 600; 310; 30; 20 MG/100ML; MG/100ML; MG/100ML; MG/100ML
1000 INJECTION, SOLUTION INTRAVENOUS CONTINUOUS
Status: DISCONTINUED | OUTPATIENT
Start: 2023-01-22 | End: 2023-01-23

## 2023-01-22 RX ORDER — PROCHLORPERAZINE EDISYLATE 5 MG/ML
5 INJECTION INTRAMUSCULAR; INTRAVENOUS ONCE
Status: COMPLETED | OUTPATIENT
Start: 2023-01-22 | End: 2023-01-22

## 2023-01-22 RX ORDER — ONDANSETRON 2 MG/ML
4 INJECTION INTRAMUSCULAR; INTRAVENOUS EVERY 30 MIN PRN
Status: DISCONTINUED | OUTPATIENT
Start: 2023-01-22 | End: 2023-01-22 | Stop reason: HOSPADM

## 2023-01-22 RX ORDER — DIPHENHYDRAMINE HYDROCHLORIDE 50 MG/ML
25 INJECTION INTRAMUSCULAR; INTRAVENOUS ONCE
Status: COMPLETED | OUTPATIENT
Start: 2023-01-22 | End: 2023-01-22

## 2023-01-22 RX ORDER — ENOXAPARIN SODIUM 100 MG/ML
40 INJECTION SUBCUTANEOUS NIGHTLY
Status: DISCONTINUED | OUTPATIENT
Start: 2023-01-22 | End: 2023-01-24 | Stop reason: HOSPADM

## 2023-01-22 RX ORDER — 0.9 % SODIUM CHLORIDE 0.9 %
2000 INTRAVENOUS SOLUTION INTRAVENOUS ONCE
Status: COMPLETED | OUTPATIENT
Start: 2023-01-22 | End: 2023-01-22

## 2023-01-22 RX ORDER — POTASSIUM CHLORIDE 7.45 MG/ML
10 INJECTION INTRAVENOUS PRN
Status: DISCONTINUED | OUTPATIENT
Start: 2023-01-22 | End: 2023-01-24 | Stop reason: HOSPADM

## 2023-01-22 RX ORDER — ONDANSETRON 2 MG/ML
4 INJECTION INTRAMUSCULAR; INTRAVENOUS EVERY 6 HOURS PRN
Status: DISCONTINUED | OUTPATIENT
Start: 2023-01-22 | End: 2023-01-24 | Stop reason: HOSPADM

## 2023-01-22 RX ORDER — ONDANSETRON 2 MG/ML
4 INJECTION INTRAMUSCULAR; INTRAVENOUS EVERY 6 HOURS PRN
Status: DISCONTINUED | OUTPATIENT
Start: 2023-01-22 | End: 2023-01-22 | Stop reason: SDUPTHER

## 2023-01-22 RX ORDER — SODIUM CHLORIDE 9 MG/ML
500 INJECTION, SOLUTION INTRAVENOUS PRN
Status: DISCONTINUED | OUTPATIENT
Start: 2023-01-22 | End: 2023-01-24 | Stop reason: HOSPADM

## 2023-01-22 RX ORDER — MAGNESIUM HYDROXIDE/ALUMINUM HYDROXICE/SIMETHICONE 120; 1200; 1200 MG/30ML; MG/30ML; MG/30ML
30 SUSPENSION ORAL EVERY 6 HOURS PRN
Status: DISCONTINUED | OUTPATIENT
Start: 2023-01-22 | End: 2023-01-24 | Stop reason: HOSPADM

## 2023-01-22 RX ORDER — POTASSIUM CHLORIDE 20 MEQ/1
40 TABLET, EXTENDED RELEASE ORAL PRN
Status: DISCONTINUED | OUTPATIENT
Start: 2023-01-22 | End: 2023-01-24 | Stop reason: HOSPADM

## 2023-01-22 RX ORDER — PROCHLORPERAZINE EDISYLATE 5 MG/ML
10 INJECTION INTRAMUSCULAR; INTRAVENOUS ONCE
Status: DISCONTINUED | OUTPATIENT
Start: 2023-01-22 | End: 2023-01-22

## 2023-01-22 RX ORDER — MAGNESIUM SULFATE IN WATER 40 MG/ML
2000 INJECTION, SOLUTION INTRAVENOUS PRN
Status: DISCONTINUED | OUTPATIENT
Start: 2023-01-22 | End: 2023-01-24 | Stop reason: HOSPADM

## 2023-01-22 RX ORDER — ONDANSETRON 4 MG/1
4 TABLET, ORALLY DISINTEGRATING ORAL EVERY 8 HOURS PRN
Status: DISCONTINUED | OUTPATIENT
Start: 2023-01-22 | End: 2023-01-24 | Stop reason: HOSPADM

## 2023-01-22 RX ORDER — SODIUM CHLORIDE 0.9 % (FLUSH) 0.9 %
5-40 SYRINGE (ML) INJECTION PRN
Status: DISCONTINUED | OUTPATIENT
Start: 2023-01-22 | End: 2023-01-24 | Stop reason: HOSPADM

## 2023-01-22 RX ADMIN — ONDANSETRON 4 MG: 2 INJECTION INTRAMUSCULAR; INTRAVENOUS at 14:40

## 2023-01-22 RX ADMIN — DIPHENHYDRAMINE HYDROCHLORIDE 50 MG: 50 INJECTION, SOLUTION INTRAMUSCULAR; INTRAVENOUS at 16:28

## 2023-01-22 RX ADMIN — PROCHLORPERAZINE EDISYLATE 5 MG: 5 INJECTION INTRAMUSCULAR; INTRAVENOUS at 10:08

## 2023-01-22 RX ADMIN — PROCHLORPERAZINE EDISYLATE 10 MG: 5 INJECTION INTRAMUSCULAR; INTRAVENOUS at 16:28

## 2023-01-22 RX ADMIN — SODIUM CHLORIDE, POTASSIUM CHLORIDE, SODIUM LACTATE AND CALCIUM CHLORIDE 1000 ML: 600; 310; 30; 20 INJECTION, SOLUTION INTRAVENOUS at 19:07

## 2023-01-22 RX ADMIN — DIPHENHYDRAMINE HYDROCHLORIDE 25 MG: 50 INJECTION, SOLUTION INTRAMUSCULAR; INTRAVENOUS at 10:08

## 2023-01-22 RX ADMIN — ONDANSETRON 4 MG: 2 INJECTION INTRAMUSCULAR; INTRAVENOUS at 10:56

## 2023-01-22 RX ADMIN — SODIUM CHLORIDE 2000 ML: 9 INJECTION, SOLUTION INTRAVENOUS at 10:09

## 2023-01-22 ASSESSMENT — LIFESTYLE VARIABLES
HOW OFTEN DO YOU HAVE A DRINK CONTAINING ALCOHOL: NEVER
HOW MANY STANDARD DRINKS CONTAINING ALCOHOL DO YOU HAVE ON A TYPICAL DAY: PATIENT DOES NOT DRINK

## 2023-01-22 NOTE — ED NOTES
Report called to [facility] RN in the ICU at [facility]  01/22/23 3081

## 2023-01-22 NOTE — ED NOTES
Pt mother to nurses station, concerned that just prior to pt having syncopal episodes that pt is staring and not responding. Pt mother states that pt sister has epilepsy.       Danielle Road, RN  01/22/23 9274

## 2023-01-22 NOTE — LETTER
[address]  [address] [place] [address]  Phone: [phone]    No name on file. January 24, 2023     Patient: Akil Burnham   YOB: 2000   Date of Visit: 1/22/2023       To Whom It May Concern:      Mattie Srinivasan has been a Patient at [facility] 1/22/23- 1/24/23    It is my medical opinion that Mattie Srinivasan may return to work on 1/30/23. Pt may also return to school on 1/30/23. If you have any questions or concerns, please don't hesitate to call.     Sincerely,        Susan Boswell RN  For Dr. Ambrose Dobson

## 2023-01-22 NOTE — ED PROVIDER NOTES
Emergency Department Encounter  Location: [address]    Patient: Leroy Gore  MRN: [id number]  : 2000  Date of evaluation: 2023  ED Provider: Magno Guthrie DO, FACEP    Chief Complaint:    Emesis (Started vomiting since 4 am x 4-5 times with 4-5 syncopal episodes )    [place]:  Leroy Gore is a 25 y.o. female that presents to the emergency department with complaints of vomiting that started around 4 AM.  She is vomited 4-5 times. She states she is passed out 4-5 times since she started vomiting. She is also complaining of diarrhea. She has had this problem previously with vomiting associated with syncope. She states after she vomits she feels very lightheaded and passes out. Patient states she is also feeling very hot and sweaty before she vomits. She denies any chest pain. She denies cough. She denies abdominal pain or chest pain at this time. She has had problems with vasovagal syncope from vomiting previously. He denies pregnancy because she is on Nexplanon. ROS - see HPI, below listed is current ROS at time of my eval:  At least 10 systems reviewed and otherwise acutely negative except as in the [address]   General:  No fevers, no chills, no weakness  Eyes:  No recent vison changes, no discharge  ENT:  No sore throat, no nasal congestion, no hearing changes  Cardiovascular:  No chest pain, no palpitations  Respiratory:  No shortness of breath, no cough, no wheezing  Gastrointestinal:  No pain, positive for nausea with vomiting and diarrhea   musculoskeletal:  No muscle pain, no joint pain  Skin:  No rash, no pruritis, no easy bruising  Neurologic:  No speech problems, no headache, no extremity numbness, no extremity tingling, no extremity weakness  Psychiatric:  No anxiety  Genitourinary:  No dysuria, no hematuria  Endocrine:  No unexpected weight gain, no unexpected weight loss  Extremities:  no edema, no pain    Past Medical History:   Diagnosis Date Acute gastroenteritis     Allergic     Anxiety     Asthma     Depression     Panic attack     PTSD (post-traumatic stress disorder)      History reviewed. No pertinent surgical history. Family History   Problem Relation Age of Onset    Heart Disease Mother         heart attacks    High Blood Pressure Mother     Depression Father     High Blood Pressure Father     Asthma Father     Allergies Father     Down Syndrome Sister     Other Sister         epilepsy;tracheal malasia; hyperthyroidism    Asthma Brother     Allergies Brother     Arrhythmia Sister     Anemia Maternal Grandmother      Social History     Socioeconomic History    Marital status: Single     Spouse name: Not on file    Number of children: Not on file    Years of education: Not on file    Highest education level: Not on file   Occupational History    Not on file   Tobacco Use    Smoking status: Never     Passive exposure: Yes    Smokeless tobacco: Never   Vaping Use    Vaping Use: Every day    Substances: Nicotine, Flavoring   Substance and Sexual Activity    Alcohol use: Yes     Comment: Rare    Drug use: Not Currently     Types: Marijuana Champ Cue)    Sexual activity: Yes   Other Topics Concern    Not on file   Social History Narrative    Not on file     Social Determinants of Health     Financial Resource Strain: Not on file   Food Insecurity: Not on file   Transportation Needs: Not on file   Physical Activity: Not on file   Stress: Not on file   Social Connections: Not on file   Intimate Partner Violence: Not on file   Housing Stability: Not on file     Current Facility-Administered Medications   Medication Dose Route Frequency Provider Last Rate Last Admin    ondansetron (ZOFRAN) injection 4 mg  4 mg IntraVENous Q30 Min PRN Dwight Finn DO   4 mg at 01/22/23 1056     Current Outpatient Medications   Medication Sig Dispense Refill    methylPREDNISolone (MEDROL, PEDRO LUIS,) 4 MG tablet Take by mouth.  (Patient not taking: Reported on 1/22/2023) 1 kit 0 ARIPiprazole (ABILIFY) 5 MG tablet Take 1 tablet by mouth daily (Patient not taking: No sig reported) 30 tablet 0    sertraline (ZOLOFT) 50 MG tablet Take 1 tablet by mouth daily (Patient not taking: Reported on 12/7/2022) 30 tablet 0    hydrOXYzine (ATARAX) 25 MG tablet Take 25 mg by mouth daily      ondansetron (ZOFRAN ODT) 4 MG disintegrating tablet Take 1 tablet by mouth every 6 hours as needed for Nausea or Vomiting (Patient not taking: No sig reported) 10 tablet 0    albuterol sulfate HFA (VENTOLIN HFA) 108 (90 Base) MCG/ACT inhaler Inhale 2 puffs into the lungs every 6 hours as needed for Wheezing (cough) Please dispense with spacer. (Patient not taking: No sig reported) 1 Inhaler 0     No Known Allergies    Nursing Notes Reviewed    Physical Exam:  ED Triage Vitals   Enc Vitals Group      BP 01/22/23 0930 (!) 110/92      Heart Rate 01/22/23 0931 94      Resp 01/22/23 0931 18      Temp --       Temp src --       SpO2 01/22/23 0930 100 %      Weight 01/22/23 0931 170 lb (77.1 kg)      Height 01/22/23 0931 5' 5\" (1.651 m)      Head Circumference --       Peak Flow --       Pain Score --       Pain Loc --       Pain Edu? --       Excl. in [address]? --      GENERAL APPEARANCE: Awake and alert. Cooperative. Mild acute distress. She appears ill  HEAD: Normocephalic. Atraumatic. EYES: EOM's grossly intact. Sclera anicteric. ENT: Tolerates saliva. No trismus. NECK: Supple. Trachea midline. CARDIO: RRR. Radial pulse 2+. LUNGS: Respirations unlabored. CTAB. No wheezes rales or rhonchi  ABDOMEN: Soft. Non-distended. Non-tender. No guarding or rebound  EXTREMITIES: No acute deformities. SKIN: Warm and dry. Good turgor  NEUROLOGICAL: No gross facial drooping. Moves all 4 extremities spontaneously. PSYCHIATRIC: Normal mood.      Labs:  Results for orders placed or performed during the hospital encounter of 01/22/23   CBC with Auto Differential   Result Value Ref Range    WBC 14.5 (H) 4.0 - 10.5 K/CU MM    RBC 5. 38 4.2 - 5.4 M/CU MM    Hemoglobin 15.8 12.5 - 16.0 GM/DL    Hematocrit 46.9 37 - 47 %    MCV 87.2 78 - 100 FL    MCH 29.4 27 - 31 PG    MCHC 33.7 32.0 - 36.0 %    RDW 12.7 11.7 - 14.9 %    Platelets 118 237 - 857 K/CU MM    MPV 11.7 (H) 7.5 - 11.1 FL    Differential Type AUTOMATED DIFFERENTIAL     Segs Relative 86.7 (H) 36 - 66 %    Lymphocytes % 7.4 (L) 24 - 44 %    Monocytes % 4.4 (H) 0 - 4 %    Eosinophils % 0.9 0 - 3 %    Basophils % 0.2 0 - 1 %    Segs Absolute 12.6 K/CU MM    Lymphocytes Absolute 1.1 K/CU MM    Monocytes Absolute 0.6 K/CU MM    Eosinophils Absolute 0.1 K/CU MM    Basophils Absolute 0.0 K/CU MM    Immature Neutrophil % 0.4 0 - 0.43 %    Total Immature Neutrophil 0.06 K/CU MM   Comprehensive Metabolic Panel   Result Value Ref Range    Sodium 139 135 - 145 MMOL/L    Potassium 4.0 3.5 - 5.1 MMOL/L    Chloride 104 99 - 110 mMol/L    CO2 17 (L) 21 - 32 MMOL/L    BUN 14 6 - 23 MG/DL    Creatinine 0.8 0.6 - 1.1 MG/DL    Est, Glom Filt Rate >60 >60 mL/min/1.73m2    Glucose 149 (H) 70 - 99 MG/DL    Calcium 9.8 8.3 - 10.6 MG/DL    Albumin 4.8 3.4 - 5.0 GM/DL    Total Protein 7.6 6.4 - 8.2 GM/DL    Total Bilirubin 0.6 0.0 - 1.0 MG/DL    ALT 15 10 - 40 U/L    AST 21 15 - 37 IU/L    Alkaline Phosphatase 58 40 - 129 IU/L    Anion Gap 18 (H) 4 - 16   Urinalysis with Reflex to Culture    Specimen: Urine   Result Value Ref Range    Color, UA YELLOW YELLOW    Clarity, UA CLEAR CLEAR    Glucose, Urine NEGATIVE NEGATIVE MG/DL    Bilirubin Urine NEGATIVE NEGATIVE MG/DL    Ketones, Urine NEGATIVE NEGATIVE MG/DL    Specific Gravity, UA 1.020 1.001 - 1.035    Blood, Urine NEGATIVE NEGATIVE    pH, Urine 6.5 5.0 - 8.0    Protein, UA NEGATIVE NEGATIVE MG/DL    Urobilinogen, Urine 0.2 0.2 - 1.0 MG/DL    Nitrite Urine, Quantitative NEGATIVE NEGATIVE    Leukocyte Esterase, Urine NEGATIVE NEGATIVE    Urinalysis Comments       Microscopic exam not performed based on chemical results unless requested in original order. HCG Serum, Qualitative   Result Value Ref Range    hCG Qual NEGATIVE    D-Dimer, Quantitative   Result Value Ref Range    D-Dimer, Quant <200 <230 NG/mL(DDU)   Magnesium   Result Value Ref Range    Magnesium 1.8 1.8 - 2.4 mg/dl   Troponin   Result Value Ref Range    Troponin T <0.010 <0.01 NG/ML       EKG (if obtained): (All EKG's are interpreted by myself in the absence of a cardiologist)  The Ekg interpreted by me in the absence of a cardiologist shows. normal sinus rhythm with a rate of 98  Axis is   Normal  QTc is   446  Intervals and Durations are unremarkable. No specific ST-T wave changes appreciated. No evidence of acute ischemia. No significant change from prior EKG dated 9 December 2020]    Radiographs (if obtained):  [] The following radiograph was interpreted by myself in the absence of a radiologist:  [x] Radiologist's Report reviewed at time of ED visit:  No orders to display       ED Course and MDM:  This patient presents to the emergency department with nausea and vomiting. She has been having syncopal episodes when she vomits. Here in the emergency department the patient had an IV established was given IV fluids as well as Zofran and Compazine and Benadryl. During the time before she got her medication the patient had a pause of about 10 seconds where she was showing P waves on the monitor but no ventricular impulse was being conducted and she was not having any QRS waves on the monitor. This occurred during one of the episodes when she was feeling lightheaded vomited and then passed out again. She has had a couple other pauses that have not been as long but I am very concerned about this monitor strip which did show nascent paced impulses but they are not being conducted and this may be vasovagal but I think it would benefit from further work-up and cardiology evaluation she and her family agree.   She is feeling much better after the medication is kicked and she is no longer vomiting. CC/HPI Summary, DDx, ED Course, and Reassessment: Vasovagal syncope, viral illness, cardiac arrhythmia    History from : Patient    Limitations to history : None    Patient was given the following medications:  Medications   ondansetron (ZOFRAN) injection 4 mg (4 mg IntraVENous Given 1/22/23 1056)   0.9 % sodium chloride bolus (0 mLs IntraVENous Stopped 1/22/23 1100)   diphenhydrAMINE (BENADRYL) injection 25 mg (25 mg IntraVENous Given 1/22/23 1008)   prochlorperazine (COMPAZINE) injection 5 mg (5 mg IntraVENous Given 1/22/23 1008)       Independent Imaging Interpretation by me: None    EKG (if obtained): (All EKG's are interpreted by myself in the absence of a cardiologist) interpreted as above    Chronic conditions affecting care: None    Discussion with Other Profesionals : None    Social Determinants : None    Records Reviewed : None    Disposition Considerations (tests considered but not done, Shared Decision Making, Pt Expectation of Test or Tx.): Relative to these findings on the cardiac monitor and recommending admission for this patient. I discussed case with Dr. Alexander Ornelas who has agreed to accept the patient to the hospitalist service in [facility]. The patient is currently awaiting a bed to be available and once a bed is available unit will be obtained to take her to [facility] for continued valuation treatment and observation. She is remained in stable condition since her vomiting has ceased. Patient will be admitted for observation in [facility] with cardiac consultation    I am the Primary Clinician of Record. Final Impression:  1. Other cardiac arrhythmia    2. Vasovagal syncope    3. Nausea vomiting and diarrhea      DISPOSITION Decision To Transfer    Patient referred to: No follow-up provider specified.   Discharge medications:  New Prescriptions    No medications on file     (Please note that portions of this note may have been completed with a voice recognition program. Efforts were made to edit the dictations but occasionally words are mis-transcribed.)    Michelle Key DO, [facility]  Board certified in [address] [place] [address]        Michelle Key, [address] [place] [address]  01/22/23 8446

## 2023-01-22 NOTE — H&P
V2.0  History and Physical      Name:  Dave Davis /Age/Sex: 2000  (25 y.o. female)   MRN & CSN:  [id number] & [id number] Encounter Date/Time: 2023 6:28 PM EST   [facility]  [facility] PCP: Jaya Resendiz MD       Hospital Day: 1    Assessment and Plan:   Dave Davis is a 25 y.o. female with a pmh of COVID, anxiety and syncope episode who presents with Syncope and collapse    Hospital Problems             Last Modified POA    * (Principal) Syncope and collapse 2023 Yes       Syncope: Intractable Nausea, vomiting  Diarrhea  -Possible cause of syncope could be dehydration related given vomiting/diarrhea iso Acute gastroenteritis, possibly vasovagal syncope but will need to rule out Cardiologic cause given sinus pauses were reported on telemetry  -Admit to [facility]  -Telemetry  -IV LR at 125 mill per hour  -N.p.o. until nausea resolves  -Antiemetics as needed  -Orthostatics  -TTE  -Cardiology consult  -Replete electrolytes to keep        -Follow Stool studies  Anxiety:  -Currently reports being off medications  Leukocytosis:        - Might be reactive iso stress        -Follow infectious workup         -Follow fever/ WBC count        -Will hold off antibiotics at this time        -Follow Urine hcg     Disposition:   Current Living situation: Home  Expected Disposition: Home  Estimated D/C: 1-2 days    Diet Diet NPO   DVT Prophylaxis [x] Lovenox, []  Heparin, [] SCDs, [] Ambulation,  [] Eliquis, [] Xarelto, [] Coumadin   Code Status Full Code   Surrogate Decision Maker/ POA Self     History from:     patient    History of Present Illness:     Chief Complaint: Intractable nausea and vomiting/syncope  Dave Davis is a 25 y.o. female with pmh anxiety, COVID and syncope who presents with syncope episode. Patient reports to be in her usual state of health and started experiencing multiple episodes of intractable nausea and NBNB vomiting with multiple diarrhea episodes since morning. Patient started to experience syncope episodes associated with the vomiting. Patient denies taking any status food but remembers taking pizza from [address] Friday night. Patient reports positive sick contact with 2 colleagues who are feeling sick. Patient denies any chest pain/shortness of breath/fever or chills. Patient admits to smoking vape and marijuana and reports using marijuana last time previous night. Patient had previous episodes of vomiting and syncope in the setting of changing position as per patient with prodrome of tunnel vision, palpitations, warm sensation and nausea before episodes. Vital signs stable, labs with bicarb 17, anion gap 18, Trope negative, leukocytosis 14.5k with left shift, urinalysis unremarkable. Patient was noticed to have some 8 to 10 seconds pauses per ED attending on the telemetry and is currently being admitted for possible cardiogenic source of syncope work-up. Review of Systems: Need 10 Elements   Review of Systems    Review of Systems:   Constitutional: Negative. Negative for activity change, appetite change, chills, diaphoresis, fatigue, fever and unexpected weight change. HENT: Negative. Negative for congestion, dental problem, drooling, ear discharge, ear pain, facial swelling, hearing loss, mouth sores, nosebleeds, postnasal drip, rhinorrhea, sinus pressure, sinus pain, sneezing, sore throat, tinnitus, trouble swallowing and voice change. Eyes: Negative. Negative for photophobia, pain, discharge, redness, itching and visual disturbance. Respiratory: Negative. Negative for apnea, cough, choking, chest tightness, shortness of breath, wheezing and stridor. Cardiovascular: Negative. Negative for chest pain and palpitations. Gastrointestinal: Negative. Negative for abdominal distention, abdominal pain, anal bleeding, blood in stool, constipation, diarrhea, nausea, rectal pain and vomiting. Endocrine: Negative.   Negative for cold intolerance, heat intolerance, polydipsia, polyphagia and polyuria. Genitourinary: Negative. Negative for decreased urine volume, difficulty urinating, dysuria, enuresis, flank pain, frequency, genital sores, hematuria, penile discharge, penile pain, penile swelling, scrotal swelling, testicular pain and urgency. Musculoskeletal: Negative. Negative for arthralgias, back pain, gait problem, joint swelling, myalgias, neck pain and neck stiffness. Skin: Negative. Negative for color change, pallor, rash and wound. Allergic/Immunologic: Negative for environmental allergies, food allergies and immunocompromised state. Neurological: Negative. Negative for dizziness, tremors, seizures, syncope, facial asymmetry, speech difficulty, weakness, light-headedness, numbness and headaches. Hematological: Negative. Negative for adenopathy. Does not bruise/bleed easily. Psychiatric/Behavioral: Negative. Negative for agitation, behavioral problems, confusion, decreased concentration, dysphoric mood, hallucinations, self-injury, sleep disturbance and suicidal ideas. The patient is not nervous/anxious and is not hyperactive. Objective:   No intake or output data in the 24 hours ending 01/22/23 1828   Vitals: There were no vitals filed for this visit. Medications Prior to Admission     Prior to Admission medications    Medication Sig Start Date End Date Taking? Authorizing Provider   methylPREDNISolone (MEDROL, PEDRO LUIS,) 4 MG tablet Take by mouth.   Patient not taking: Reported on 1/22/2023 12/7/22   Elly Garay DO   ARIPiprazole (ABILIFY) 5 MG tablet Take 1 tablet by mouth daily  Patient not taking: No sig reported 6/4/21   Smita Paulson, MD   sertraline (ZOLOFT) 50 MG tablet Take 1 tablet by mouth daily  Patient not taking: Reported on 12/7/2022 6/4/21 7/4/21  Smita Later, MD   hydrOXYzine (ATARAX) 25 MG tablet Take 25 mg by mouth daily    Historical Provider, MD   ondansetron (ZOFRAN ODT) 4 MG disintegrating tablet Take 1 tablet by mouth every 6 hours as needed for Nausea or Vomiting  Patient not taking: No sig reported 10/4/20   Azalea Osborn DO   albuterol sulfate HFA (VENTOLIN HFA) 108 (90 Base) MCG/ACT inhaler Inhale 2 puffs into the lungs every 6 hours as needed for Wheezing (cough) Please dispense with spacer. Patient not taking: No sig reported 10/23/19   Jaya Resendiz MD       Physical Exam: Need 8 Elements   Physical Exam     General: NAD  Eyes: EOMI  ENT: neck supple  Cardiovascular: Regular rate. Respiratory: Clear to auscultation  Gastrointestinal: Soft, non tender  Genitourinary: no suprapubic tenderness  Musculoskeletal: No edema  Skin: warm, dry  Neuro: Alert. Psych: Mood appropriate. Past Medical History:   PMHx   Past Medical History:   Diagnosis Date    Acute gastroenteritis     Allergic     Anxiety     Asthma     Depression     Panic attack     PTSD (post-traumatic stress disorder)      PSHX:  has no past surgical history on file. Allergies: No Known Allergies  Fam HX:  family history includes Allergies in her brother and father; Anemia in her maternal grandmother; Arrhythmia in her sister; Asthma in her brother and father; Depression in her father; Down Syndrome in her sister; Heart Disease in her mother; High Blood Pressure in her father and mother; Other in her sister.   Soc HX:   Social History     Socioeconomic History    Marital status: Single   Tobacco Use    Smoking status: Never     Passive exposure: Yes    Smokeless tobacco: Never   Vaping Use    Vaping Use: Every day    Substances: Nicotine, Flavoring   Substance and Sexual Activity    Alcohol use: Yes     Comment: Rare    Drug use: Not Currently     Types: Marijuana Lucianne Bars)    Sexual activity: Yes       Medications:   Medications:    sodium chloride flush  5-40 mL IntraVENous 2 times per day    enoxaparin  40 mg SubCUTAneous Daily      Infusions:    sodium chloride      lactated ringers IV soln       PRN Meds: sodium chloride flush, 5-40 mL, PRN  sodium chloride, , PRN  ondansetron, 4 mg, Q8H PRN   Or  ondansetron, 4 mg, Q6H PRN  polyethylene glycol, 17 g, Daily PRN  acetaminophen, 650 mg, Q6H PRN   Or  acetaminophen, 650 mg, Q6H PRN  potassium chloride, 40 mEq, PRN   Or  potassium alternative oral replacement, 40 mEq, PRN   Or  potassium chloride, 10 mEq, PRN  magnesium sulfate, 2,000 mg, PRN  aluminum & magnesium hydroxide-simethicone, 30 mL, Q6H PRN  ondansetron, 4 mg, Q6H PRN        Labs      CBC:   Recent Labs     01/22/23  0945   WBC 14.5*   HGB 15.8        BMP:    Recent Labs     01/22/23  0945      K 4.0      CO2 17*   BUN 14   CREATININE 0.8   GLUCOSE 149*     Hepatic:   Recent Labs     01/22/23  0945   AST 21   ALT 15   BILITOT 0.6   ALKPHOS 58     Lipids:   Lab Results   Component Value Date/Time    CHOL 136 02/08/2018 08:09 AM    HDL 56 02/08/2018 08:09 AM    TRIG 47 02/08/2018 08:09 AM     Hemoglobin A1C: No results found for: LABA1C  TSH: No results found for: TSH  Troponin:   Lab Results   Component Value Date/Time    TROPONINT <0.010 01/22/2023 10:00 AM     Lactic Acid: No results for input(s): LACTA in the last 72 hours. BNP: No results for input(s): PROBNP in the last 72 hours.   UA:  Lab Results   Component Value Date/Time    NITRU NEGATIVE 01/22/2023 09:45 AM    COLORU YELLOW 01/22/2023 09:45 AM    PHUR 6.0 05/15/2018 03:10 PM    WBCUA 0 TO 2 10/05/2020 05:45 PM    RBCUA NO CELLS SEEN 10/05/2020 05:45 PM    MUCUS 1+ 10/05/2020 05:45 PM    BACTERIA FEW 10/05/2020 05:45 PM    CLARITYU CLEAR 01/22/2023 09:45 AM    SPECGRAV 1.020 01/22/2023 09:45 AM    LEUKOCYTESUR NEGATIVE 01/22/2023 09:45 AM    UROBILINOGEN 0.2 01/22/2023 09:45 AM    BILIRUBINUR NEGATIVE 01/22/2023 09:45 AM    BILIRUBINUR neg 05/15/2018 03:10 PM    BLOODU NEGATIVE 01/22/2023 09:45 AM    GLUCOSEU neg 05/15/2018 03:10 PM    KETUA NEGATIVE 01/22/2023 09:45 AM     Urine Cultures: No results found for: Brandi Roldan Cultures: No results found for: BC  No results found for: BLOODCULT2  Organism: No results found for: ORG    Imaging/Diagnostics Last 24 Hours   No results found.     Personally reviewed Lab Studies, Imaging    Electronically signed by Camron Germain MD on 1/22/2023 at 6:28 PM

## 2023-01-22 NOTE — ED NOTES
Pt states she is feeling terrible and feels like she is going to pass out again. This nurse witnessed pt lose consciousness, on tele monitor with long pause, p waves present but no QRS. Pt laid flat, no palpable pulse, calling out for help and when preparing to start cpr organized electrical rhythm returned and palpable pulse. Pt aroused immediately but repetitively asking what happened.  Fiance at bedside     Omer Aguirre RN  01/22/23 3241

## 2023-01-22 NOTE — ED NOTES
This nurse at bedside and completing ekg, pt had syncopal episode with ekg attached and ekg printed and shows normal sinus rhythm, handed to Dr Greg Fallon. Dr Greg Fallon updated that pt did have syncopal episode while attached to ekg. Pt aroused after several seconds and reoriented immediately.       Lolis Bergeron RN  01/22/23 0593

## 2023-01-22 NOTE — ED NOTES
Pt ambulated to and from bathroom with mother to obtain urine sample. Pt tolerated very well and returned to cot/cardiac monitor.  Pt denies any sign of or increase of dizziness, distress, etc.     Ilan Guevara RN  01/22/23 6207

## 2023-01-23 LAB
ALBUMIN SERPL-MCNC: 3.9 GM/DL (ref 3.4–5)
ALP BLD-CCNC: 47 IU/L (ref 40–128)
ALT SERPL-CCNC: 10 U/L (ref 10–40)
ANION GAP SERPL CALCULATED.3IONS-SCNC: 10 MMOL/L (ref 4–16)
AST SERPL-CCNC: 14 IU/L (ref 15–37)
BASOPHILS ABSOLUTE: 0 K/CU MM
BASOPHILS RELATIVE PERCENT: 0.1 % (ref 0–1)
BILIRUB SERPL-MCNC: 0.6 MG/DL (ref 0–1)
BUN BLDV-MCNC: 6 MG/DL (ref 6–23)
CALCIUM SERPL-MCNC: 8.8 MG/DL (ref 8.3–10.6)
CHLORIDE BLD-SCNC: 108 MMOL/L (ref 99–110)
CO2: 23 MMOL/L (ref 21–32)
CREAT SERPL-MCNC: 0.7 MG/DL (ref 0.6–1.1)
DIFFERENTIAL TYPE: ABNORMAL
EKG ATRIAL RATE: 67 BPM
EKG ATRIAL RATE: 98 BPM
EKG DIAGNOSIS: NORMAL
EKG DIAGNOSIS: NORMAL
EKG P AXIS: 44 DEGREES
EKG P AXIS: 68 DEGREES
EKG P-R INTERVAL: 134 MS
EKG P-R INTERVAL: 150 MS
EKG Q-T INTERVAL: 350 MS
EKG Q-T INTERVAL: 406 MS
EKG QRS DURATION: 84 MS
EKG QRS DURATION: 92 MS
EKG QTC CALCULATION (BAZETT): 429 MS
EKG QTC CALCULATION (BAZETT): 446 MS
EKG R AXIS: 47 DEGREES
EKG R AXIS: 63 DEGREES
EKG T AXIS: 23 DEGREES
EKG T AXIS: 49 DEGREES
EKG VENTRICULAR RATE: 67 BPM
EKG VENTRICULAR RATE: 98 BPM
EOSINOPHILS ABSOLUTE: 0 K/CU MM
EOSINOPHILS RELATIVE PERCENT: 0.1 % (ref 0–3)
GFR SERPL CREATININE-BSD FRML MDRD: >60 ML/MIN/1.73M2
GLUCOSE BLD-MCNC: 92 MG/DL (ref 70–99)
HCT VFR BLD CALC: 38 % (ref 37–47)
HEMOGLOBIN: 12.5 GM/DL (ref 12.5–16)
HETEROPHILE ANTIBODIES: NEGATIVE
IMMATURE NEUTROPHIL %: 0.4 % (ref 0–0.43)
LV EF: 58 %
LVEF MODALITY: NORMAL
LYMPHOCYTES ABSOLUTE: 1 K/CU MM
LYMPHOCYTES RELATIVE PERCENT: 12.9 % (ref 24–44)
MCH RBC QN AUTO: 29.4 PG (ref 27–31)
MCHC RBC AUTO-ENTMCNC: 32.9 % (ref 32–36)
MCV RBC AUTO: 89.4 FL (ref 78–100)
MONOCYTES ABSOLUTE: 0.5 K/CU MM
MONOCYTES RELATIVE PERCENT: 6 % (ref 0–4)
NUCLEATED RBC %: 0 %
PDW BLD-RTO: 12.7 % (ref 11.7–14.9)
PLATELET # BLD: 208 K/CU MM (ref 140–440)
PMV BLD AUTO: 11.6 FL (ref 7.5–11.1)
POTASSIUM SERPL-SCNC: 3.8 MMOL/L (ref 3.5–5.1)
RBC # BLD: 4.25 M/CU MM (ref 4.2–5.4)
SARS-COV-2, NAAT: NOT DETECTED
SEGMENTED NEUTROPHILS ABSOLUTE COUNT: 6.2 K/CU MM
SEGMENTED NEUTROPHILS RELATIVE PERCENT: 80.5 % (ref 36–66)
SODIUM BLD-SCNC: 141 MMOL/L (ref 135–145)
SOURCE: NORMAL
TOTAL IMMATURE NEUTOROPHIL: 0.03 K/CU MM
TOTAL NUCLEATED RBC: 0 K/CU MM
TOTAL PROTEIN: 5.8 GM/DL (ref 6.4–8.2)
WBC # BLD: 7.7 K/CU MM (ref 4–10.5)

## 2023-01-23 PROCEDURE — 86318 IA INFECTIOUS AGENT ANTIBODY: CPT

## 2023-01-23 PROCEDURE — 93010 ELECTROCARDIOGRAM REPORT: CPT | Performed by: INTERNAL MEDICINE

## 2023-01-23 PROCEDURE — 99253 IP/OBS CNSLTJ NEW/EST LOW 45: CPT | Performed by: INTERNAL MEDICINE

## 2023-01-23 PROCEDURE — 87804 INFLUENZA ASSAY W/OPTIC: CPT

## 2023-01-23 PROCEDURE — 96361 HYDRATE IV INFUSION ADD-ON: CPT

## 2023-01-23 PROCEDURE — 85025 COMPLETE CBC W/AUTO DIFF WBC: CPT

## 2023-01-23 PROCEDURE — 2580000003 HC RX 258: Performed by: STUDENT IN AN ORGANIZED HEALTH CARE EDUCATION/TRAINING PROGRAM

## 2023-01-23 PROCEDURE — 96372 THER/PROPH/DIAG INJ SC/IM: CPT

## 2023-01-23 PROCEDURE — 86663 EPSTEIN-BARR ANTIBODY: CPT

## 2023-01-23 PROCEDURE — 2580000003 HC RX 258: Performed by: NURSE PRACTITIONER

## 2023-01-23 PROCEDURE — 87635 SARS-COV-2 COVID-19 AMP PRB: CPT

## 2023-01-23 PROCEDURE — G0378 HOSPITAL OBSERVATION PER HR: HCPCS

## 2023-01-23 PROCEDURE — 94761 N-INVAS EAR/PLS OXIMETRY MLT: CPT

## 2023-01-23 PROCEDURE — 86665 EPSTEIN-BARR CAPSID VCA: CPT

## 2023-01-23 PROCEDURE — 93306 TTE W/DOPPLER COMPLETE: CPT

## 2023-01-23 PROCEDURE — 6360000002 HC RX W HCPCS: Performed by: STUDENT IN AN ORGANIZED HEALTH CARE EDUCATION/TRAINING PROGRAM

## 2023-01-23 PROCEDURE — 36415 COLL VENOUS BLD VENIPUNCTURE: CPT

## 2023-01-23 PROCEDURE — 6370000000 HC RX 637 (ALT 250 FOR IP): Performed by: NURSE PRACTITIONER

## 2023-01-23 PROCEDURE — 86664 EPSTEIN-BARR NUCLEAR ANTIGEN: CPT

## 2023-01-23 PROCEDURE — 80053 COMPREHEN METABOLIC PANEL: CPT

## 2023-01-23 RX ORDER — LANOLIN ALCOHOL/MO/W.PET/CERES
3 CREAM (GRAM) TOPICAL ONCE
Status: COMPLETED | OUTPATIENT
Start: 2023-01-23 | End: 2023-01-23

## 2023-01-23 RX ORDER — SODIUM CHLORIDE, SODIUM LACTATE, POTASSIUM CHLORIDE, CALCIUM CHLORIDE 600; 310; 30; 20 MG/100ML; MG/100ML; MG/100ML; MG/100ML
1000 INJECTION, SOLUTION INTRAVENOUS CONTINUOUS
Status: DISCONTINUED | OUTPATIENT
Start: 2023-01-23 | End: 2023-01-24 | Stop reason: HOSPADM

## 2023-01-23 RX ADMIN — SODIUM CHLORIDE, POTASSIUM CHLORIDE, SODIUM LACTATE AND CALCIUM CHLORIDE 1000 ML: 600; 310; 30; 20 INJECTION, SOLUTION INTRAVENOUS at 15:11

## 2023-01-23 RX ADMIN — ENOXAPARIN SODIUM 40 MG: 100 INJECTION SUBCUTANEOUS at 21:16

## 2023-01-23 RX ADMIN — Medication 25 ML: at 21:17

## 2023-01-23 RX ADMIN — Medication 3 MG: at 21:16

## 2023-01-23 NOTE — CONSULTS
CARDIOLOGY CONSULT NOTE   Reason for consultation:  syncope    Referring physician:  No admitting provider for patient encounter. Primary care physician: Justin Moreno MD      Dear  Dr. Edu Pearce admitting provider for patient encounter. Thanks for the consult. Chief Complaints :No chief complaint on file. History of present illness:Jackie is a 25 y. o.year old who presents with complaints of syncope passing out she started having episodes of nausea and sudden vomiting and diarrhea episode suddenly out of the blue. Gradually got sick after vomiting 4-5 times she got very lightheaded and dizzy and passed out she does not know how long she was out for she was sweaty heart not in any medication  When came to the emergency department was noted to have sinus arrhythmias  Apparently on telemetry she was noted to have P waves without any QRS when she was feeling lightheaded and dizzy? She had similar event when she had COVID and was told that she had vagal syncope? She is not orthostatic on examination but it was and after IV hydration    Past medical history:    has a past medical history of Acute gastroenteritis, Allergic, Anxiety, Asthma, Depression, Panic attack, and PTSD (post-traumatic stress disorder). Past surgical history:   has no past surgical history on file. Social History:   reports that she has never smoked. She has been exposed to tobacco smoke. She has never used smokeless tobacco. She reports current alcohol use. She reports that she does not currently use drugs after having used the following drugs: Marijuana Berneta Rios).   Family history:   no family history of CAD, STROKE of DM at early age    No Known Allergies    sodium chloride flush 0.9 % injection 5-40 mL, 2 times per day  sodium chloride flush 0.9 % injection 5-40 mL, PRN  0.9 % sodium chloride infusion, PRN  enoxaparin (LOVENOX) injection 40 mg, Nightly  ondansetron (ZOFRAN-ODT) disintegrating tablet 4 mg, Q8H PRN   Or  ondansetron (ZOFRAN) injection 4 mg, Q6H PRN  polyethylene glycol (GLYCOLAX) packet 17 g, Daily PRN  acetaminophen (TYLENOL) tablet 650 mg, Q6H PRN   Or  acetaminophen (TYLENOL) suppository 650 mg, Q6H PRN  lactated ringers IV soln infusion 1,000 mL, Continuous  potassium chloride (KLOR-CON M) extended release tablet 40 mEq, PRN   Or  potassium bicarb-citric acid (EFFER-K) effervescent tablet 40 mEq, PRN   Or  potassium chloride 10 mEq/100 mL IVPB (Peripheral Line), PRN  magnesium sulfate 2000 mg in 50 mL IVPB premix, PRN  aluminum & magnesium hydroxide-simethicone (MAALOX) 200-200-20 MG/5ML suspension 30 mL, Q6H PRN      Current Facility-Administered Medications   Medication Dose Route Frequency Provider Last Rate Last Admin    sodium chloride flush 0.9 % injection 5-40 mL  5-40 mL IntraVENous 2 times per day Viola Diez MD        sodium chloride flush 0.9 % injection 5-40 mL  5-40 mL IntraVENous PRN Viola Diez MD        0.9 % sodium chloride infusion  500 mL IntraVENous PRN Viola Diez MD        enoxaparin (LOVENOX) injection 40 mg  40 mg SubCUTAneous Nightly Viola Diez MD        ondansetron (ZOFRAN-ODT) disintegrating tablet 4 mg  4 mg Oral Q8H PRN Viola Diez MD        Or    ondansetron [facility]) injection 4 mg  4 mg IntraVENous Q6H PRN Viola Diez MD        polyethylene glycol (GLYCOLAX) packet 17 g  17 g Oral Daily PRN Viola Diez MD        acetaminophen (TYLENOL) tablet 650 mg  650 mg Oral Q6H PRN Viola Diez MD        Or    acetaminophen (TYLENOL) suppository 650 mg  650 mg Rectal Q6H PRN Viola Diez MD        lactated ringers IV soln infusion 1,000 mL  1,000 mL IntraVENous Continuous Viola Diez  mL/hr at 01/22/23 1907 1,000 mL at 01/22/23 1907    potassium chloride (KLOR-CON M) extended release tablet 40 mEq  40 mEq Oral PRN Viola Diez MD        Or    potassium bicarb-citric acid (EFFER-K) effervescent tablet 40 mEq  40 mEq Oral PRN Viola Diez MD        Or    potassium chloride 10 mEq/100 mL IVPB (Peripheral Line)  10 mEq IntraVENous PRN Cali Onofre MD        magnesium sulfate 2000 mg in 50 mL IVPB premix  2,000 mg IntraVENous PRN Cali Onofre MD        aluminum & magnesium hydroxide-simethicone (MAALOX) 200-200-20 MG/5ML suspension 30 mL  30 mL Oral Q6H PRN Cali Onofre MD         Review of Systems:   Constitutional: No Fever or Weight Loss   Eyes: No Decreased Vision  ENT: No Headaches, Hearing Loss or Vertigo  Cardiovascular: As per HPI  Respiratory: As per HPI  Gastrointestinal: No abdominal pain, appetite loss, blood in stools, constipation, diarrhea or heartburn  Genitourinary: No dysuria, trouble voiding, or hematuria  Musculoskeletal:  No gait disturbance, weakness or joint complaints  Integumentary: No rash or pruritis  Neurological: No TIA or stroke symptoms  Psychiatric: No anxiety or depression  Endocrine: No malaise, fatigue or temperature intolerance  Hematologic/Lymphatic: No bleeding problems, blood clots or swollen lymph nodes  Allergic/Immunologic: No nasal congestion or hives  All systems negative except as marked. Physical Examination:    Vitals:    01/22/23 1730 01/22/23 1900 01/23/23 0021 01/23/23 0400   BP: 99/65 (!) 96/59 110/68 106/63   Pulse: 79 82 83 59   Resp: 18 21 16 16   Temp: 98.7 °F (37.1 °C) 98.7 °F (37.1 °C) 98 °F (36.7 °C) 98 °F (36.7 °C)   TempSrc: Oral Oral Oral Oral   SpO2: 99% 97% 97% 97%       General Appearance:  No distress, conversant    Constitutional:  Well developed, Well nourished, No acute distress, Non-toxic appearance. HENT:  Normocephalic, Atraumatic, Bilateral external ears normal, Oropharynx moist, No oral exudates, Nose normal. Neck- Normal range of motion, No tenderness, Supple, No stridor,no apical-carotid delay  Lymphatics : no palpable lymph nodes  Eyes:  PERRL, EOMI, Conjunctiva normal, No discharge. Respiratory:  Normal breath sounds, No respiratory distress, No wheezing, No chest tenderness. ,no use of accessory muscles, crackles Absent Cardiovascular: (PMI) apex non displaced,no lifts no thrills, ankle swelling Absent  , 1+, s1 and s2 audible,Murmur. Absent , JVD not noted    Abdomen /GI:  Bowel sounds normal, Soft, No tenderness, No masses, No gross visceromegaly   :  No costovertebral angle tenderness   Musculoskeletal:  No edema, no tenderness, no deformities. Back- no tenderness  Integument:  Well hydrated, no rash   Lymphatic:  No lymphadenopathy noted   Neurologic:  Alert & oriented x 3, CN 2-12 normal, normal motor function, normal sensory function, no focal deficits noted           Medical decision making and Data review:    Lab Review   Recent Labs     01/23/23 0346   WBC 7.7   HGB 12.5   HCT 38.0         Recent Labs     01/23/23 0346      K 3.8      CO2 23   BUN 6   CREATININE 0.7     Recent Labs     01/23/23 0346   AST 14*   ALT 10   BILITOT 0.6   ALKPHOS 47     Recent Labs     01/22/23  1000   TROPONINT <0.010       Recent Labs     01/22/23 2007   PROBNP 149.9     No results found for: INR, PROTIME    EKG: (reviewed by myself)    ECHO:(reviewed by myself)    Chest Xray:(reviewed by myself)  XR CHEST PORTABLE    Result Date: 1/22/2023  EXAMINATION: ONE XRAY VIEW OF THE CHEST 1/22/2023 7:11 pm COMPARISON: 10/05/2020 HISTORY: ORDERING SYSTEM PROVIDED HISTORY: r/o pneumonia TECHNOLOGIST PROVIDED HISTORY: Reason for exam:->r/o pneumonia Reason for Exam: r/o pneumonia Additional signs and symptoms: Syncope and collapse FINDINGS: Lungs: Clear. Pleura: No effusion or pneumothorax. Cardiomediastinal silhouette: Normal contours. Bones: No acute osseous findings. Soft tissues: Normal.     No acute pulmonary findings. All labs, medications and tests reviewed by myself including data  from outside source , patient and available family . Continue all other medications of all above medical condition listed as is.      Impression:  Principal Problem:    Syncope and collapse  Resolved Problems:    * No resolved hospital problems. *      Assessment: 25 y. o.year old with PMH of  has a past medical history of Acute gastroenteritis, Allergic, Anxiety, Asthma, Depression, Panic attack, and PTSD (post-traumatic stress disorder). Plan and Recommendations:    Reviewed telemetry strips at least during hospitalization she is noted to have sinus arrhythmias I did not show any dangerous arrhythmias? Syncope /Dizziness : check orthostatics, compression stockings, will get echo,   Outpatient 30-day monitor and treadmill when she is feeling better can be done as an outpatient   I think she has sinus arrhythmia with possible vagal events? But will get outpatient rhythm monitor to know for sure   TSH is normal  DVT prophylaxis if no contraindication  6. Dyslipidemia: continue statins           Thank you  much for consult and giving us the opportunity in contributing in the care of this patient. Please feel free to call me for any questions.        Tiffanie Herndon MD, 1/23/2023 11:04 AM

## 2023-01-23 NOTE — PROGRESS NOTES
Orthostatic Vitals completed, see flow sheet. Pt has had some arrhthymias through night, dropped beats, occasional lack of visible P waves. Strips in chart, unable to see on EKG Pt does state she feels better now then she did prior to visiting the ER.

## 2023-01-23 NOTE — PROGRESS NOTES
V2.0  [facility] [place] Hospitalist Progress Note      Name:  Wei Hidalgo /Age/Sex: 2000  (25 y.o. female)   MRN & CSN:  [id number] & [id number] Encounter Date/Time: 2023 8:54 AM EST    [facility] PCP: Mynor Villarreal MD       Hospital Day: 2    Assessment and Plan:   Wei Hidalgo is a 25 y.o. female with pmh of Allergies, Anxiety, Depression, Asthma, and PTSD,  who presents with Syncope and collapse       This patient was discussed with Dr. Arleth Garcia. He was agreeable with the plan and management as dictated above. Plan:    Syncope: Likely 2/2 dehydration  -Possible cause of syncope could be dehydration related given vomiting/diarrhea iso Acute gastroenteritis, possibly vasovagal syncope but will need to rule out Cardiologic cause given sinus pauses were reported on telemetry  -Admit to [facility]  -Telemetry  -IV LR at 125 ml/hr, slowed down to 75 ml/hr as she is tolerating PO well   -nausea & vomiting resolved now, tolerated liquids, advancing to regular diet  -Antiemetics as needed  -Orthostatics and stockings   -Cardiology consult: Dr. Osvaldo Lock, will get ECHO, reviewed tele strips, no dangerous arrhythmias seen, can FU outpatient for Holter Monitor 30 days and treadmill stress test  -ECHO: pending, if normal can discharge and have her FU with Cardiology outpatient   -Replete electrolytes to keep  -Follow Stool studies: never collected and diarrhea is now resolved   -Covid-19 Rapid: Negative   Mono Screen: NEG, Owen Wilcox Antibodies: pending    Anxiety:  -Currently reports being off medications    Leukocytosis: resolved today 23       - Might be reactive, will add Rapid Covid/Flu, Monospot and Owen Wilcox Antibodies         -Will hold off antibiotics at this time        -Urine Preg: negative     Diet ADULT DIET;  Regular, increasing from Liquids    DVT Prophylaxis [x] Lovenox, []  Heparin, [] SCDs, [] Ambulation,  [] Eliquis, [] Xarelto  [] Coumadin   Code Status Full Code   Disposition From: Home  Expected Disposition: Home  Estimated Date of Discharge: 1 days  Patient requires continued admission due to Cardiology Evaluation for Syncope, ECHO still pending   Surrogate Decision Maker/ POA Self, mother      Subjective:     Chief Complaint: Intractable Nausea, Vomiting and Diarrhea       Jackie Corona is a 22 y.o. female who presents with  pmh anxiety, COVID and syncope who presents with syncope episode.  Patient reports to be in her usual state of health and started experiencing multiple episodes of intractable nausea and NBNB vomiting with multiple diarrhea episodes since morning.  Patient started to experience syncope episodes associated with the vomiting.  Patient denies taking any status food but remembers taking pizza from [employer] Friday night.  Patient reports positive sick contact with 2 colleagues who are feeling sick.  Patient denies any chest pain/shortness of breath/fever or chills.  Patient admits to smoking vape and marijuana and reports using marijuana last time previous night.  Patient had previous episodes of vomiting and syncope in the setting of changing position as per patient with prodrome of tunnel vision, palpitations, warm sensation and nausea before episodes.  Vital signs stable, labs with bicarb 17, anion gap 18, Trope negative, leukocytosis 14.5k with left shift, urinalysis unremarkable.  Patient was noticed to have some 8 to 10 seconds pauses per ED attending on the telemetry and is currently being admitted for possible cardiogenic source of syncope work-up.     Today she is feeling much better, no fevers, nausea, vomiting or diarrhea at all. Is hungry and feels like eating now. Denies any sxs of illness today such as fevers, chills, body aches, sore throat or cough.     Review of Systems:    Ten point ROS is negative, unless otherwise noted above.     Objective:   No intake or output data in the 24 hours ending 01/23/23 1440     Vitals:   Vitals:  01/23/23 0400   BP: 106/63   Pulse: 59   Resp: 16   Temp: 98 °F (36.7 °C)   SpO2: 97%       Physical Exam:     General: NAD  Eyes: EOMI  ENT: neck supple  Cardiovascular: Regular rate and rhythm, Normal S1/S2, no murmurs or gallops   Respiratory: Clear to auscultation bilaterally, no rales, rhonchi or wheezes, normal oxygenation on RA   Gastrointestinal: Soft, non tender, normal BS x 4  Genitourinary: no suprapubic tenderness  Musculoskeletal: No edema  Skin: warm, dry  Neuro: Alert and oriented x 3   Psych: Mood appropriate. Medications:   Medications:    sodium chloride flush  5-40 mL IntraVENous 2 times per day    enoxaparin  40 mg SubCUTAneous Nightly      Infusions:    lactated ringers IV soln      sodium chloride       PRN Meds: sodium chloride flush, 5-40 mL, PRN  sodium chloride, 500 mL, PRN  ondansetron, 4 mg, Q8H PRN   Or  ondansetron, 4 mg, Q6H PRN  polyethylene glycol, 17 g, Daily PRN  acetaminophen, 650 mg, Q6H PRN   Or  acetaminophen, 650 mg, Q6H PRN  potassium chloride, 40 mEq, PRN   Or  potassium alternative oral replacement, 40 mEq, PRN   Or  potassium chloride, 10 mEq, PRN  magnesium sulfate, 2,000 mg, PRN  aluminum & magnesium hydroxide-simethicone, 30 mL, Q6H PRN      Labs      Recent Results (from the past 24 hour(s))   Urinalysis    Collection Time: 01/22/23  6:59 PM   Result Value Ref Range    Color, UA YELLOW YELLOW    Clarity, UA CLEAR CLEAR    Glucose, Urine NEGATIVE NEGATIVE MG/DL    Bilirubin Urine NEGATIVE NEGATIVE MG/DL    Ketones, Urine 15 (A) NEGATIVE MG/DL    Specific Gravity, UA 1.015 1.001 - 1.035    Blood, Urine NEGATIVE NEGATIVE    pH, Urine 8.0 5.0 - 8.0    Protein, UA NEGATIVE NEGATIVE MG/DL    Urobilinogen, Urine 0.2 0.2 - 1.0 MG/DL    Nitrite Urine, Quantitative NEGATIVE NEGATIVE    Leukocyte Esterase, Urine NEGATIVE NEGATIVE    Urinalysis Comments       Microscopic exam not performed based on chemical results unless requested in original order.    Pregnancy, urine Collection Time: 01/22/23  6:59 PM   Result Value Ref Range    Pregnancy, Urine NEGATIVE NEGATIVE    Specific Gravity, Urine 1.015 1.001 - 1.035    Interpretation HCG METHOD LIMITATIONS:    Drug screen multi urine    Collection Time: 01/22/23  6:59 PM   Result Value Ref Range    Cannabinoid Scrn, Ur UNCONFIRMED POSITIVE (A) NEGATIVE    Amphetamines NEGATIVE NEGATIVE    Cocaine Metabolite NEGATIVE NEGATIVE    Benzodiazepine Screen, Urine NEGATIVE NEGATIVE    Barbiturate Screen, Ur NEGATIVE NEGATIVE    Opiates, Urine NEGATIVE NEGATIVE    Phencyclidine, Urine NEGATIVE NEGATIVE    Oxycodone NEGATIVE NEGATIVE   EKG 12 Lead    Collection Time: 01/22/23  7:19 PM   Result Value Ref Range    Ventricular Rate 67 BPM    Atrial Rate 67 BPM    P-R Interval 134 ms    QRS Duration 92 ms    Q-T Interval 406 ms    QTc Calculation (Bazett) 429 ms    P Axis 44 degrees    R Axis 47 degrees    T Axis 23 degrees    Diagnosis       Sinus rhythm with marked sinus arrhythmia  Otherwise normal ECG  When compared with ECG of 22-JAN-2023 09:49,  Nonspecific T wave abnormality now evident in Anterior leads     TSH    Collection Time: 01/22/23  8:07 PM   Result Value Ref Range    TSH, High Sensitivity 0.974 0.270 - 4.20 uIu/ml   Lipid Panel    Collection Time: 01/22/23  8:07 PM   Result Value Ref Range    Triglycerides 50 <150 MG/DL    Cholesterol 128 <200 MG/DL    HDL 44 >40 MG/DL    LDL Calculated 74 <100 MG/DL   Hemoglobin A1C    Collection Time: 01/22/23  8:07 PM   Result Value Ref Range    Hemoglobin A1C 5.1 4.2 - 6.3 %    eAG 100 mg/dL   Brain Natriuretic Peptide    Collection Time: 01/22/23  8:07 PM   Result Value Ref Range    Pro-.9 <300 PG/ML   Comprehensive Metabolic Panel w/ Reflex to MG    Collection Time: 01/23/23  3:46 AM   Result Value Ref Range    Sodium 141 135 - 145 MMOL/L    Potassium 3.8 3.5 - 5.1 MMOL/L    Chloride 108 99 - 110 mMol/L    CO2 23 21 - 32 MMOL/L    BUN 6 6 - 23 MG/DL    Creatinine 0.7 0.6 - 1.1 MG/DL Est, Glom Filt Rate >60 >60 mL/min/1.73m2    Glucose 92 70 - 99 MG/DL    Calcium 8.8 8.3 - 10.6 MG/DL    Albumin 3.9 3.4 - 5.0 GM/DL    Total Protein 5.8 (L) 6.4 - 8.2 GM/DL    Total Bilirubin 0.6 0.0 - 1.0 MG/DL    ALT 10 10 - 40 U/L    AST 14 (L) 15 - 37 IU/L    Alkaline Phosphatase 47 40 - 128 IU/L    Anion Gap 10 4 - 16   CBC with Auto Differential    Collection Time: 01/23/23  3:46 AM   Result Value Ref Range    WBC 7.7 4.0 - 10.5 K/CU MM    RBC 4.25 4.2 - 5.4 M/CU MM    Hemoglobin 12.5 12.5 - 16.0 GM/DL    Hematocrit 38.0 37 - 47 %    MCV 89.4 78 - 100 FL    MCH 29.4 27 - 31 PG    MCHC 32.9 32.0 - 36.0 %    RDW 12.7 11.7 - 14.9 %    Platelets 950 107 - 107 K/CU MM    MPV 11.6 (H) 7.5 - 11.1 FL    Differential Type AUTOMATED DIFFERENTIAL     Segs Relative 80.5 (H) 36 - 66 %    Lymphocytes % 12.9 (L) 24 - 44 %    Monocytes % 6.0 (H) 0 - 4 %    Eosinophils % 0.1 0 - 3 %    Basophils % 0.1 0 - 1 %    Segs Absolute 6.2 K/CU MM    Lymphocytes Absolute 1.0 K/CU MM    Monocytes Absolute 0.5 K/CU MM    Eosinophils Absolute 0.0 K/CU MM    Basophils Absolute 0.0 K/CU MM    Nucleated RBC % 0.0 %    Total Nucleated RBC 0.0 K/CU MM    Total Immature Neutrophil 0.03 K/CU MM    Immature Neutrophil % 0.4 0 - 0.43 %   Mononucleosis screen    Collection Time: 01/23/23  9:32 AM   Result Value Ref Range    Monospot NEGATIVE NEGATIVE        Imaging/Diagnostics Last 24 Hours   XR CHEST PORTABLE    Result Date: 1/22/2023  EXAMINATION: ONE XRAY VIEW OF THE CHEST 1/22/2023 7:11 pm COMPARISON: 10/05/2020 HISTORY: ORDERING SYSTEM PROVIDED HISTORY: r/o pneumonia TECHNOLOGIST PROVIDED HISTORY: Reason for exam:->r/o pneumonia Reason for Exam: r/o pneumonia Additional signs and symptoms: Syncope and collapse FINDINGS: Lungs: Clear. Pleura: No effusion or pneumothorax. Cardiomediastinal silhouette: Normal contours. Bones: No acute osseous findings. Soft tissues: Normal.     No acute pulmonary findings.        Electronically signed by Zaki Almonte Addie Muniz CNP on 1/23/2023 at 2:40 PM

## 2023-01-24 VITALS
DIASTOLIC BLOOD PRESSURE: 89 MMHG | SYSTOLIC BLOOD PRESSURE: 114 MMHG | OXYGEN SATURATION: 97 % | TEMPERATURE: 98.3 F | RESPIRATION RATE: 20 BRPM | HEART RATE: 75 BPM

## 2023-01-24 DIAGNOSIS — R55 SYNCOPE AND COLLAPSE: Primary | ICD-10-CM

## 2023-01-24 PROCEDURE — 96361 HYDRATE IV INFUSION ADD-ON: CPT

## 2023-01-24 PROCEDURE — G0378 HOSPITAL OBSERVATION PER HR: HCPCS

## 2023-01-24 PROCEDURE — APPSS30 APP SPLIT SHARED TIME 16-30 MINUTES

## 2023-01-24 PROCEDURE — 99232 SBSQ HOSP IP/OBS MODERATE 35: CPT | Performed by: INTERNAL MEDICINE

## 2023-01-24 RX ORDER — ONDANSETRON 4 MG/1
4 TABLET, ORALLY DISINTEGRATING ORAL EVERY 6 HOURS PRN
Qty: 10 TABLET | Refills: 0 | Status: CANCELLED | OUTPATIENT
Start: 2023-01-24

## 2023-01-24 RX ORDER — HYDROXYZINE HYDROCHLORIDE 25 MG/1
25 TABLET, FILM COATED ORAL DAILY
Status: CANCELLED | OUTPATIENT
Start: 2023-01-24

## 2023-01-24 RX ORDER — HYDROXYZINE 50 MG/1
50 TABLET, FILM COATED ORAL EVERY 6 HOURS PRN
Qty: 20 TABLET | Refills: 0 | Status: SHIPPED | OUTPATIENT
Start: 2023-01-24

## 2023-01-24 RX ORDER — ONDANSETRON 4 MG/1
4 TABLET, ORALLY DISINTEGRATING ORAL EVERY 6 HOURS PRN
Qty: 20 TABLET | Refills: 0 | Status: SHIPPED | OUTPATIENT
Start: 2023-01-24

## 2023-01-24 NOTE — DISCHARGE INSTRUCTIONS
Medications: see computerized discharge medication list  Activity: activity as tolerated  Diet: regular diet   Disposition: home  Discharged Condition: Stable       Fainting: Care Instructions  Overview     When you faint, or pass out, you lose consciousness for a short time. A brief drop in blood flow to the brain often causes it. When you fall or lie down, more blood flows to your brain and you regain consciousness. Emotional stress, pain, or overheating--especially if you have been standing--can make you faint. In these cases, fainting is usually not serious. But fainting can be a sign of a more serious problem. Your doctor may want you to have more tests to rule out other causes. The treatment you need depends on the reason why you fainted. Follow-up care is a key part of your treatment and safety. Be sure to make and go to all appointments, and call your doctor if you are having problems. It's also a good idea to know your test results and keep a list of the medicines you take. How can you care for yourself at home? Drink plenty of fluids to prevent dehydration. If you have kidney, heart, or liver disease and have to limit fluids, talk with your doctor before you increase your fluid intake. Ask your doctor when it is safe to drive. When should you call for help? Call 911 anytime you think you may need emergency care. For example, call if:    You have symptoms of a heart problem. These may include:  Chest pain or pressure. Severe trouble breathing. A fast or irregular heartbeat. Lightheadedness or sudden weakness. Coughing up pink, foamy mucus. Passing out. After you call 911, the  may tell you to chew 1 adult-strength or 2 to 4 low-dose aspirin. Wait for an ambulance. Do not try to drive yourself. You have symptoms of a stroke. These may include:  Sudden numbness, tingling, weakness, or loss of movement in your face, arm, or leg, especially on only one side of your body.   Sudden vision changes. Sudden trouble speaking. Sudden confusion or trouble understanding simple statements. Sudden problems with walking or balance. A sudden, severe headache that is different from past headaches. You passed out (lost consciousness) again. Watch closely for changes in your health, and be sure to contact your doctor if:    You do not get better as expected. Where can you learn more? Go to http://www.woods.com/ and enter A848 to learn more about \"Fainting: Care Instructions. \"  Current as of: March 9, 2022               Content Version: 13.5  © 6252-0771 [employer]. Care instructions adapted under license by [facility] ([facility]). If you have questions about a medical condition or this instruction, always ask your healthcare professional. [address] any warranty or liability for your use of this information.

## 2023-01-24 NOTE — PROGRESS NOTES
V2.0  [facility] [place] [facility] Hospitalist Progress Note      Name:  Nicolas Villagomez /Age/Sex: 2000  (25 y.o. female)   MRN & CSN:  [id number] & [id number] Encounter Date/Time: 2023 8:54 AM EST    Location:  [address] [place] [address] PCP: Mau Piña MD       Hospital Day: 3    Assessment and Plan:   Nicolas Villagomez is a 25 y.o. female with pmh of Allergies, Anxiety, Depression, Asthma, and PTSD,  who presents with Syncope and collapse       This patient was discussed with Dr. Alfonzo Roper. He was agreeable with the plan and management as dictated above. Plan:    Syncope: Likely 2/2 dehydration  -Possible cause of syncope could be dehydration related given vomiting/diarrhea iso Acute gastroenteritis, possibly vasovagal syncope but will need to rule out Cardiologic cause given sinus pauses were reported on telemetry  -Admit to [facility]  -Telemetry  -IV LR at 125 ml/hr, slowed down to 75 ml/hr as she is tolerating PO well   -nausea & vomiting resolved now, tolerated liquids, advancing to regular diet  -Antiemetics as needed  -Orthostatics and stockings   -Cardiology consult: Dr. Sara Finley, will get ECHO, reviewed tele strips, no dangerous arrhythmias seen, can FU outpatient for Holter Monitor 30 days and treadmill stress test  -ECHO: pending, if normal can discharge and have her FU with Cardiology outpatient   -Replete electrolytes to keep  -Follow Stool studies: never collected and diarrhea is now resolved   -Covid-19 Rapid: Negative   Mono Screen: NEG, Owen Wilcox Antibodies: pending    Anxiety:  -Currently reports being off medications    Leukocytosis: resolved today 23       - Might be reactive, will add Rapid Covid/Flu, Monospot and Owen Wilcox Antibodies         -Will hold off antibiotics at this time        -Urine Preg: negative     Diet ADULT DIET;  Regular, increasing from Liquids    DVT Prophylaxis [x] Lovenox, []  Heparin, [] SCDs, [] Ambulation,  [] Eliquis, [] Xarelto  [] Coumadin   Code Status Full Code Disposition From: Home  Expected Disposition: Home  Estimated Date of Discharge: 1 days  Patient requires continued admission due to Cardiology Evaluation for Syncope, ECHO still pending   Surrogate Decision Maker/ POA Self, mother      Subjective:     Chief Complaint: Intractable Nausea, Vomiting and Diarrhea       Dulce Mc is a 25 y.o. female who presents with  pmh anxiety, COVID and syncope who presents with syncope episode. Patient reports to be in her usual state of health and started experiencing multiple episodes of intractable nausea and NBNB vomiting with multiple diarrhea episodes since morning. Patient started to experience syncope episodes associated with the vomiting. Patient denies taking any status food but remembers taking pizza from [address] [employer] Friday night. Patient reports positive sick contact with 2 colleagues who are feeling sick. Patient denies any chest pain/shortness of breath/fever or chills. Patient admits to smoking vape and marijuana and reports using marijuana last time previous night. Patient had previous episodes of vomiting and syncope in the setting of changing position as per patient with prodrome of tunnel vision, palpitations, warm sensation and nausea before episodes. Vital signs stable, labs with bicarb 17, anion gap 18, Trope negative, leukocytosis 14.5k with left shift, urinalysis unremarkable. Patient was noticed to have some 8 to 10 seconds pauses per ED attending on the telemetry and is currently being admitted for possible cardiogenic source of syncope work-up. Not having any lightheadedness or dizziness. No further diarrhea. Ambulating without difficulty. Review of Systems:    Ten point ROS is negative, unless otherwise noted above. Objective:      Intake/Output Summary (Last 24 hours) at 1/24/2023 1232  Last data filed at 1/24/2023 1000  Gross per 24 hour   Intake 240 ml   Output --   Net 240 ml        Vitals:   Vitals:    01/24/23 1100 BP: 114/89   Pulse: 75   Resp: 20   Temp: 98.3 °F (36.8 °C)   SpO2:        Physical Exam:     General: NAD  Eyes: no discharge  HENT: NCAT  Cardiovascular: Regular rate and rhythm, Normal S1/S2, no murmurs or gallops   Respiratory: Clear to auscultation bilaterally, no rales, rhonchi or wheezes, normal oxygenation on RA   Gastrointestinal: Soft, non tender, nondistended  Genitourinary: no suprapubic tenderness  Musculoskeletal: No edema  Skin: warm, dry  Neuro: Alert and oriented   Psych: Mood appropriate. Medications:   Medications:    sodium chloride flush  5-40 mL IntraVENous 2 times per day    enoxaparin  40 mg SubCUTAneous Nightly      Infusions:    lactated ringers IV soln 1,000 mL (01/23/23 1511)    sodium chloride       PRN Meds: sodium chloride flush, 5-40 mL, PRN  sodium chloride, 500 mL, PRN  ondansetron, 4 mg, Q8H PRN   Or  ondansetron, 4 mg, Q6H PRN  polyethylene glycol, 17 g, Daily PRN  acetaminophen, 650 mg, Q6H PRN   Or  acetaminophen, 650 mg, Q6H PRN  potassium chloride, 40 mEq, PRN   Or  potassium alternative oral replacement, 40 mEq, PRN   Or  potassium chloride, 10 mEq, PRN  magnesium sulfate, 2,000 mg, PRN  aluminum & magnesium hydroxide-simethicone, 30 mL, Q6H PRN      Labs      Recent Results (from the past 24 hour(s))   COVID-19, Rapid    Collection Time: 01/23/23  2:24 PM    Specimen: Nasopharyngeal   Result Value Ref Range    Source UNKNOWN     SARS-CoV-2, NAAT NOT DETECTED NOT DETECTED        Imaging/Diagnostics Last 24 Hours   XR CHEST PORTABLE    Result Date: 1/22/2023  EXAMINATION: ONE XRAY VIEW OF THE CHEST 1/22/2023 7:11 pm COMPARISON: 10/05/2020 HISTORY: ORDERING SYSTEM PROVIDED HISTORY: r/o pneumonia TECHNOLOGIST PROVIDED HISTORY: Reason for exam:->r/o pneumonia Reason for Exam: r/o pneumonia Additional signs and symptoms: Syncope and collapse FINDINGS: Lungs: Clear. Pleura: No effusion or pneumothorax. Cardiomediastinal silhouette: Normal contours.  Bones: No acute osseous findings. Soft tissues: Normal.     No acute pulmonary findings.        Electronically signed by Cesar Rodriguez MD on 1/24/2023 at 12:32 PM

## 2023-01-24 NOTE — PROGRESS NOTES
Outpatient Pharmacy Progress Note for Meds-to-Beds    Total number of Prescriptions Filled: 2  The following medications were dispensed to the patient during the discharge process:  Hydroxyzine  Ondansetron    Additional Documentation:  Patient picked-up the medication(s) in the OP Pharmacy      Thank you for letting us serve your patients.   [address]    [address], [address] [facility]    Phone: [phone]    Fax: [phone]

## 2023-01-24 NOTE — FLOWSHEET NOTE
PT given discharge instructions, IV removed and dressing applied. Pt and parents questions answered. Taken to lobby in wheelchair and home with parents.

## 2023-01-24 NOTE — PROGRESS NOTES
Aditi [address]  Phone: [phone]    Fax [phone]                  Michelle Ocampo MD, Delonte Benavides MD, Yandy Ansari MD, MD Leisa Sánchez MD Lavon Sloop, MD Dr. Kaya Garcia MD, BRENDA Osorio, APRBORA Dixon, APRBORA Martin, APRN  Laura Finney, SIMON    CARDIOLOGY  NOTE      Name:  Mecca Sprain /Age/Sex: 2000  (25 y.o. female)   MRN & CSN:  [id number] & [id number] Admission Date/Time: 2023  5:34 PM   Location:  [address] [place] [address]- PCP: Carmin Spatz, MD       Hospital Day: 3    - Cardiology consult is for:  Syncope      ASSESSMENT/ PLAN:  Recurrent syncope with prodromal symptoms  Sinus arrhythmia  -Orthostatics negative, blood pressure stable. -Advised compression stockings and hydration.  -Echo per below  -Patient received 30-day event monitor as outpatient  -We will obtain exercise stress test as outpatient  -Some episodes of bradycardia noted during sleep. Nausea, vomiting, diarrhea  -Per primary care  Marijuana use  Vaping  -Daily marijuana-vape use  -Advised marijuana and vaping cessation.  -Marijuana use could be contributing to nausea and vomiting. Patient is safe for discharge from cardiology perspective with close follow-up. Patient will follow with Dr. Arnulfo Olmos in 1 to 2 weeks. Echo: 2023  Left ventricular systolic function is normal.   Ejection fraction is visually estimated at 55-60%. No significant valvular abnormalities. No evidence of any pericardial effusion. Subjective:  Black Perez is a 25 y. o.year old   Advised patient on marijuana and vaping cessation. Marijuana use may also be contributing to patient's nausea/vomiting.   Inform patient that she is to continue to monitor for lightheadedness dizziness palpitations, chest pain, fever, chills, blurry vision. Inform patient that she will receive outpatient work-up as well. Objective: Temperature:  Current - Temp: 98.3 °F (36.8 °C); Max - Temp  Av.3 °F (36.8 °C)  Min: 98.3 °F (36.8 °C)  Max: 98.3 °F (36.8 °C)    Respiratory Rate : Resp  Av  Min: 20  Max: 20    Pulse Range: Pulse  Av  Min: 75  Max: 75    Blood Presuure Range:  Systolic (97LQP), BFL:873 , Min:114 , MIB:933   ; Diastolic (32PUB), ZMN:79, Min:89, Max:89      Pulse ox Range: No data recorded    24hr I & O:    Intake/Output Summary (Last 24 hours) at 2023 1205  Last data filed at 2023 1000  Gross per 24 hour   Intake 240 ml   Output --   Net 240 ml         /89   Pulse 75   Temp 98.3 °F (36.8 °C) (Oral)   Resp 20   SpO2 97%         TELEMETRY: Sinus arrhythmia with noted bradycardia in 40s while sleeping     has a past medical history of Acute gastroenteritis, Allergic, Anxiety, Asthma, Depression, Panic attack, and PTSD (post-traumatic stress disorder). has no past surgical history on file. Physical Exam  Constitutional:       General: She is not in acute distress. Appearance: She is not diaphoretic. HENT:      Head: Normocephalic and atraumatic. Right Ear: External ear normal.      Left Ear: External ear normal.      Nose: Nose normal.      Mouth/Throat:      Mouth: Mucous membranes are moist.   Eyes:      Extraocular Movements: Extraocular movements intact. Comments: Pupils equal and round   Neck:      Vascular: No carotid bruit. Cardiovascular:      Rate and Rhythm: Normal rate and regular rhythm. Pulses: Normal pulses. Heart sounds: S1 normal and S2 normal. No murmur heard. No friction rub. No gallop. Pulmonary:      Effort: Pulmonary effort is normal. No respiratory distress. Breath sounds: Normal breath sounds. No rales. Chest:      Chest wall: No tenderness. Abdominal:      Palpations: Abdomen is soft. Tenderness: There is no abdominal tenderness. Musculoskeletal:      Right lower leg: No edema. Left lower leg: No edema. Skin:     General: Skin is warm and dry. Capillary Refill: Capillary refill takes less than 2 seconds. Findings: No rash. Comments: Skin turgor brisk   Neurological:      Mental Status: She is alert and oriented to person, place, and time. Mental status is at baseline. Psychiatric:         Behavior: Behavior is cooperative. Thought Content: Thought content normal.         Judgment: Judgment normal.        Medications:    sodium chloride flush  5-40 mL IntraVENous 2 times per day    enoxaparin  40 mg SubCUTAneous Nightly      lactated ringers IV soln 1,000 mL (01/23/23 1511)    sodium chloride       sodium chloride flush, sodium chloride, ondansetron **OR** ondansetron, polyethylene glycol, acetaminophen **OR** acetaminophen, potassium chloride **OR** potassium alternative oral replacement **OR** potassium chloride, magnesium sulfate, aluminum & magnesium hydroxide-simethicone    Lab Data:  CBC:   Recent Labs     01/22/23  0945 01/23/23  0346   WBC 14.5* 7.7   HGB 15.8 12.5   HCT 46.9 38.0   MCV 87.2 89.4    208     BMP:   Recent Labs     01/22/23  0945 01/23/23  0346    141   K 4.0 3.8    108   CO2 17* 23   BUN 14 6   CREATININE 0.8 0.7     LIVER PROFILE:   Recent Labs     01/22/23  0945 01/23/23  0346   AST 21 14*   ALT 15 10   BILITOT 0.6 0.6   ALKPHOS 58 47     PT/INR: No results for input(s): PROTIME, INR in the last 72 hours. APTT: No results for input(s): APTT in the last 72 hours. BNP:  No results for input(s): BNP in the last 72 hours.   TROPONIN:   Recent Labs     01/22/23  1000   TROPONINT <0.010     Labs, consult, tests reviewed          Keiry Langley PA-C, 1/24/2023 12:05 PM

## 2023-01-24 NOTE — PROGRESS NOTES
Cardiology Progress Note     Admit Date:  1/22/2023    Consult reason/ Seen today for :       Subjective and  Overnight Events : Occasional sinus arrhythmia no pauses  Echo is normal    Chief complain on admission : 25 y. o.year old who is admitted forNo chief complaint on file. Assessment / Plan:  Reviewed telemetry strips at least during hospitalization she is noted to have sinus arrhythmias I did not show any dangerous arrhythmias? Syncope /Dizziness : check orthostatics, compression stockings, will get echo,   Outpatient 30-day monitor and treadmill when she is feeling better can be done as an outpatient   I think she has sinus arrhythmia with possible vagal events? But will get outpatient rhythm monitor to know for sure   TSH is normal  DVT prophylaxis if no contraindication  6. Dyslipidemia: continue statins   Call with questions we will sign off    Past medical history:    has a past medical history of Acute gastroenteritis, Allergic, Anxiety, Asthma, Depression, Panic attack, and PTSD (post-traumatic stress disorder). Past surgical history:   has no past surgical history on file. Social History:   reports that she has never smoked. She has been exposed to tobacco smoke. She has never used smokeless tobacco. She reports current alcohol use. She reports that she does not currently use drugs after having used the following drugs: Marijuana Shabbir Curran). Family history:  family history includes Allergies in her brother and father; Anemia in her maternal grandmother; Arrhythmia in her sister; Asthma in her brother and father; Depression in her father; Down Syndrome in her sister; Heart Disease in her mother; High Blood Pressure in her father and mother; Other in her sister.     No Known Allergies    Review of Systems:    All 14 systems were reviewed and are negative  Except for the positive findings  which as documented     BP 114/89   Pulse 75   Temp 98.3 °F (36.8 °C) (Oral)   Resp 20   SpO2 97%     Intake/Output Summary (Last 24 hours) at 1/24/2023 1229  Last data filed at 1/24/2023 1000  Gross per 24 hour   Intake 240 ml   Output --   Net 240 ml     Physical Exam:  Constitutional:  Well developed, Well nourished, No acute distress, Non-toxic appearance. HENT:  Normocephalic, Atraumatic, Bilateral external ears normal, Oropharynx moist, No oral exudates, Nose normal. Neck- Normal range of motion, No tenderness, Supple, No stridor. Eyes:  PERRL, EOMI, Conjunctiva normal, No discharge. Respiratory:  Normal breath sounds, No respiratory distress, No wheezing, No chest tenderness. Cardiovascular:  Normal heart rate, Normal rhythm, No murmurs, No rubs, No gallops, JVP not elevated  Abdomen/GI:  Bowel sounds normal, Soft, No tenderness, No masses, No pulsatile masses. Musculoskeletal:  Intact distal pulses, No edema, No tenderness, No cyanosis, No clubbing. Good range of motion in all major joints. No tenderness to palpation or major deformities noted. Back- No tenderness. Integument:  Warm, Dry, No erythema, No rash. Lymphatic:  No lymphadenopathy noted. Neurologic:  Alert & oriented x 3, Normal motor function, Normal sensory function, No focal deficits noted.    Psychiatric:  Affect  and  Mood :no change    Medications:    sodium chloride flush  5-40 mL IntraVENous 2 times per day    enoxaparin  40 mg SubCUTAneous Nightly      lactated ringers IV soln 1,000 mL (01/23/23 1511)    sodium chloride       sodium chloride flush, sodium chloride, ondansetron **OR** ondansetron, polyethylene glycol, acetaminophen **OR** acetaminophen, potassium chloride **OR** potassium alternative oral replacement **OR** potassium chloride, magnesium sulfate, aluminum & magnesium hydroxide-simethicone    Lab Data:  CBC:   Recent Labs     01/22/23  0945 01/23/23  0346   WBC 14.5* 7.7   HGB 15.8 12.5   HCT 46.9 38.0   MCV 87.2 89.4    208 BMP:   Recent Labs     01/22/23  0945 01/23/23  0346    141   K 4.0 3.8    108   CO2 17* 23   BUN 14 6   CREATININE 0.8 0.7     PT/INR: No results for input(s): PROTIME, INR in the last 72 hours. BNP:    Recent Labs     01/22/23 2007   PROBNP 149.9     TROPONIN:   Recent Labs     01/22/23  1000   TROPONINT <0.010        ECHO :   echocardiogram    Assessment:  22 y. o.year old who is admitted forNo chief complaint on file. , active issues as noted below:  Impression:  Principal Problem:    Syncope and collapse  Resolved Problems:    * No resolved hospital problems. *            All labs, medications and tests reviewed by myself , continue all other medications of all above medical condition listed as is except for changes mentioned above. Thank you very much for consult , please call with questions.     Kyaw Melendrez MD, MD 1/24/2023 12:29 PM

## 2023-01-24 NOTE — DISCHARGE SUMMARY
V2.0  Discharge Summary    Name:  Akil Burnham /Age/Sex: 2000 (25 y.o. female)   Admit Date: 2023  Discharge Date: 23    MRN & CSN:  [id number] & [id number] Encounter Date and Time 23 1:29 PM EST    Attending:  Ambrose Dobson MD Discharging Provider: Ambrose Dobson MD       Hospital Course:     HPI:   Chief Complaint: Intractable nausea and vomiting/syncope  Akil Burnham is a 25 y.o. female with pmh anxiety, COVID and syncope who presents with syncope episode. Patient reports to be in her usual state of health and started experiencing multiple episodes of intractable nausea and NBNB vomiting with multiple diarrhea episodes since morning. Patient started to experience syncope episodes associated with the vomiting. Patient denies taking any status food but remembers taking pizza from [address] [facility] Friday night. Patient reports positive sick contact with 2 colleagues who are feeling sick. Patient denies any chest pain/shortness of breath/fever or chills. Patient admits to smoking vape and marijuana and reports using marijuana last time previous night. Patient had previous episodes of vomiting and syncope in the setting of changing position as per patient with prodrome of tunnel vision, palpitations, warm sensation and nausea before episodes. Vital signs stable, labs with bicarb 17, anion gap 18, Trope negative, leukocytosis 14.5k with left shift, urinalysis unremarkable. Patient was noticed to have some 8 to 10 seconds pauses per ED attending on the telemetry and is currently being admitted for possible cardiogenic source of syncope work-up. Problem Based Course:   He came in for syncope likely due to dehydration. Hydration was likely related to vomiting and diarrhea from possible acute gastroenteritis. Patient has also had a vasovagal syncope. Patient did have some sinus pauses and sinus arrhythmia seen on telemetry.   Patient had a negative urine pregnancy test.  Patient was seen by cardiology.  Patient was given IV fluids.  Nausea vomiting did resolve during the course of her stay.  She was tolerating a regular diet.  Cardiology did not feel that there are any dangerous arrhythmias seen on monitoring.   Echo showed an EF of 55-60%. Cardiology recommended outpatient follow-up for 30-day Holter monitor and treadmill stress test.  Patient was given PRN Zofran for nausea.    Patient states she has anxiety.  She was placed on as needed hydroxyzine which she has taken in the past.  She states she takes no other medications.    Patient was stable.  She is no longer having any syncopal episodes.  She is tolerating diet.  She was discharged to home.    Consults this admission:  IP CONSULT TO CARDIOLOGY    Discharge Diagnosis:     Syncope: Likely 2/2 dehydration  -Possible cause of syncope could be dehydration related given vomiting/diarrhea iso Acute gastroenteritis, possibly vasovagal syncope but will need to rule out Cardiologic cause given sinus pauses were reported on telemetry  -Ad[facility] to [facility]  -Telemetry  -IV LR at 125 ml/hr, slowed down to 75 ml/hr as she is tolerating PO well   -nausea & vomiting resolved now, tolerated liquids, advancing to regular diet  -Antiemetics as needed  -Orthostatics and stockings   -Cardiology consult: Dr. Montelongo, will get ECHO, reviewed tele strips, no dangerous arrhythmias seen, can FU outpatient for Holter Monitor 30 days and treadmill stress test  -ECHO: pending, if normal can discharge and have her FU with Cardiology outpatient   -Replete electrolytes to keep  -Follow Stool studies: never collected and diarrhea is now resolved   -Covid-19 Rapid: Negative   Mono Screen: NEG, Owen Wilcox Antibodies: pending     Anxiety:  -Currently reports being off medications     Leukocytosis: resolved today 1/23/23       - Might be reactive, will add Rapid Covid/Flu, Monospot and Owen Wilcox Antibodies         -Will hold off antibiotics at this time        -Urine  Preg: negative     Discharge Instruction:   Medications: see computerized discharge medication list  Activity: activity as tolerated  Diet: regular diet   Disposition: home  Discharged Condition: Stable    ***    Discharge Medications:        Medication List        CHANGE how you take these medications      hydrOXYzine HCl 50 MG tablet  Commonly known as: ATARAX  Take 1 tablet by mouth every 6 hours as needed for Anxiety  What changed:   medication strength  how much to take  when to take this  reasons to take this            CONTINUE taking these medications      ondansetron 4 MG disintegrating tablet  Commonly known as: ZOFRAN-ODT  Take 1 tablet by mouth every 6 hours as needed for Nausea or Vomiting            STOP taking these medications      albuterol sulfate  (90 Base) MCG/ACT inhaler  Commonly known as: Ventolin HFA     ARIPiprazole 5 MG tablet  Commonly known as: Abilify     methylPREDNISolone 4 MG tablet  Commonly known as: MEDROL (PEDRO LUIS)     sertraline 50 MG tablet  Commonly known as: Zoloft               Where to Get Your Medications        These medications were sent to [address] [place] [address] 25, [address] [facility] [address]      Phone: [phone]   hydrOXYzine HCl 50 MG tablet  ondansetron 4 MG disintegrating tablet        Objective Findings at Discharge:   /89   Pulse 75   Temp 98.3 °F (36.8 °C) (Oral)   Resp 20   SpO2 97%       Physical Exam:   General: NAD  Eyes: no discharge  HENT: NCAT  Cardiovascular: Regular rate and rhythm, Normal S1/S2, no murmurs or gallops   Respiratory: Clear to auscultation bilaterally, no rales, rhonchi or wheezes, normal oxygenation on RA   Gastrointestinal: Soft, non tender, nondistended  Genitourinary: no suprapubic tenderness  Musculoskeletal: No edema  Skin: warm, dry  Neuro: Alert and oriented   Psych: Mood appropriate.          Imaging   XR CHEST PORTABLE    Result Date: 1/22/2023  EXAMINATION: ONE XRAY VIEW OF THE CHEST 1/22/2023 7:11 pm COMPARISON: 10/05/2020 HISTORY: ORDERING SYSTEM PROVIDED HISTORY: r/o pneumonia TECHNOLOGIST PROVIDED HISTORY: Reason for exam:->r/o pneumonia Reason for Exam: r/o pneumonia Additional signs and symptoms: Syncope and collapse FINDINGS: Lungs: Clear. Pleura: No effusion or pneumothorax. Cardiomediastinal silhouette: Normal contours. Bones: No acute osseous findings. Soft tissues: Normal.     No acute pulmonary findings.        Time Spent Discharging patient 25 minutes    Electronically signed by Eligio Ewing MD on 1/24/2023 at 1:29 PM

## 2023-01-25 LAB
EBV EARLY ANTIGEN AB, IGG: <5 U/ML (ref 0–10.9)
EBV NUCLEAR AG AB: 150 U/ML (ref 0–21.9)
EPSTEIN-BARR VCA IGG: 445 U/ML (ref 0–21.9)
EPSTEIN-BARR VCA IGM: <10 U/ML (ref 0–43.9)

## 2023-01-26 ENCOUNTER — NURSE ONLY (OUTPATIENT)
Dept: CARDIOLOGY CLINIC | Age: 23
End: 2023-01-26

## 2023-01-26 DIAGNOSIS — R55 SYNCOPE AND COLLAPSE: Primary | ICD-10-CM

## 2023-01-26 NOTE — PROGRESS NOTES
30 days e-cardio monitor placed.  # [id number]. Instructed patient on how to record the event and to call monitoring center at [phone] if any problems arise. Instructed patient to disconnect the lead wires from the electrodes before bathing or showering and reattach them afterwards. Instructed patient that the electrodes should be changed every 3 days or if they no longer adhere to the skin. Patient to mail package after the monitor has ended. Patient verbalized understanding.

## 2023-01-31 ENCOUNTER — OFFICE VISIT (OUTPATIENT)
Dept: FAMILY MEDICINE CLINIC | Age: 23
End: 2023-01-31
Payer: COMMERCIAL

## 2023-01-31 VITALS
DIASTOLIC BLOOD PRESSURE: 82 MMHG | OXYGEN SATURATION: 98 % | SYSTOLIC BLOOD PRESSURE: 118 MMHG | TEMPERATURE: 99 F | HEART RATE: 85 BPM | RESPIRATION RATE: 20 BRPM

## 2023-01-31 DIAGNOSIS — R55 VASOVAGAL SYNCOPE: Primary | ICD-10-CM

## 2023-01-31 PROCEDURE — 1036F TOBACCO NON-USER: CPT | Performed by: PEDIATRICS

## 2023-01-31 PROCEDURE — 99214 OFFICE O/P EST MOD 30 MIN: CPT | Performed by: PEDIATRICS

## 2023-01-31 PROCEDURE — G8484 FLU IMMUNIZE NO ADMIN: HCPCS | Performed by: PEDIATRICS

## 2023-01-31 PROCEDURE — G8427 DOCREV CUR MEDS BY ELIG CLIN: HCPCS | Performed by: PEDIATRICS

## 2023-01-31 PROCEDURE — G8419 CALC BMI OUT NRM PARAM NOF/U: HCPCS | Performed by: PEDIATRICS

## 2023-01-31 ASSESSMENT — PATIENT HEALTH QUESTIONNAIRE - PHQ9
6. FEELING BAD ABOUT YOURSELF - OR THAT YOU ARE A FAILURE OR HAVE LET YOURSELF OR YOUR FAMILY DOWN: 1
SUM OF ALL RESPONSES TO PHQ QUESTIONS 1-9: 14
5. POOR APPETITE OR OVEREATING: 3
4. FEELING TIRED OR HAVING LITTLE ENERGY: 2
9. THOUGHTS THAT YOU WOULD BE BETTER OFF DEAD, OR OF HURTING YOURSELF: 0
8. MOVING OR SPEAKING SO SLOWLY THAT OTHER PEOPLE COULD HAVE NOTICED. OR THE OPPOSITE, BEING SO FIGETY OR RESTLESS THAT YOU HAVE BEEN MOVING AROUND A LOT MORE THAN USUAL: 1
7. TROUBLE CONCENTRATING ON THINGS, SUCH AS READING THE NEWSPAPER OR WATCHING TELEVISION: 2
2. FEELING DOWN, DEPRESSED OR HOPELESS: 1
SUM OF ALL RESPONSES TO PHQ9 QUESTIONS 1 & 2: 3
SUM OF ALL RESPONSES TO PHQ QUESTIONS 1-9: 14
3. TROUBLE FALLING OR STAYING ASLEEP: 2
SUM OF ALL RESPONSES TO PHQ QUESTIONS 1-9: 14
10. IF YOU CHECKED OFF ANY PROBLEMS, HOW DIFFICULT HAVE THESE PROBLEMS MADE IT FOR YOU TO DO YOUR WORK, TAKE CARE OF THINGS AT HOME, OR GET ALONG WITH OTHER PEOPLE: 1
SUM OF ALL RESPONSES TO PHQ QUESTIONS 1-9: 14
1. LITTLE INTEREST OR PLEASURE IN DOING THINGS: 2

## 2023-01-31 NOTE — LETTER
[facility]  [address] [address]  Phone: [phone]  Fax: [phone]    Jose Cruz Solano MD        January 31, 2023     Patient: General Sax   YOB: 2000   Date of Visit: 1/31/2023       To Whom it May Concern:    Josee Chambers was seen in my clinic on 1/31/2023. If you have any questions or concerns, please don't hesitate to call.     Sincerely,         Jose Cruz Solano MD

## 2023-02-03 NOTE — PROGRESS NOTES
Rohit Mcgill (:  2000) is a 25 y.o. female    ASSESSMENT/PLAN:    Hospital follow up for multiple syncopal episodes. Admitted for observation and telemetry after abnormal EKG/continuous monitoring in ED. Syncopal episodes likely secondary to AGE/dehydration w/ vasovagal episodes. Hospital observation, telemetry reassuring w/o arrhythmia. Echo normal. Cardiology recommended follow up w/ stress test, otherwise reassured by observation. Discussed importance of hydration, healthy salt in diet. Recommend slow, deliberate movements after position changes. Immediate follow up w/ recurrent symptoms, episodes w/ exertion, new symptoms of concern. Anxiety. Stable w/o current medication. Follow up w/ cardiology as scheduled. Transition to family medicine provider. SUBJECTIVE/OBJECTIVE:  HPI    CC: lightheadedness     Syncopal event x 5 last week w/ vomiting    Admitted for observation and telemetry after abnormal EKG/continuous monitoring in ED. Syncopal episodes likely secondary to AGE/dehydration w/ vasovagal episodes. Lab evaluation reassuring (EBV past infection)    Hospital observation, telemetry reassuring w/o arrhythmia. Echo normal. Cardiology recommended follow up w/ stress test, otherwise reassured by observation. Symptoms w exertion / exercise n  Chest pain n  Palpitations n  Difficulty breathing n    Previous events y    Fever n  Cough n  Nausea / vomiting n  Rash n  Headache n  Change in menses n    Hydration variable  Diet stable  Substance use concern +THC on hospital tox screen      /82 (Site: Right Upper Arm, Position: Sitting, Cuff Size: Small Adult)   Pulse 85   Temp 99 °F (37.2 °C)   Resp 20   SpO2 98%     Physical Exam  Vitals and nursing note reviewed. Constitutional:       Appearance: She is well-developed. She is not ill-appearing or toxic-appearing.    HENT:      Right Ear: Tympanic membrane normal.      Left Ear: Tympanic membrane normal.      Nose: No congestion or rhinorrhea. Mouth/Throat:      Mouth: Mucous membranes are moist.      Pharynx: No oropharyngeal exudate or posterior oropharyngeal erythema. Eyes:      General:         Right eye: No discharge. Left eye: No discharge. Extraocular Movements: Extraocular movements intact. Conjunctiva/sclera: Conjunctivae normal.      Pupils: Pupils are equal, round, and reactive to light. Cardiovascular:      Rate and Rhythm: Normal rate and regular rhythm. Pulses: Normal pulses. Heart sounds: Normal heart sounds. Comments: HR 70-80  Pulmonary:      Effort: Pulmonary effort is normal. No respiratory distress. Breath sounds: Normal breath sounds. No stridor. No wheezing, rhonchi or rales. Abdominal:      General: Bowel sounds are normal. There is no distension. Palpations: Abdomen is soft. Tenderness: There is no abdominal tenderness. There is no guarding or rebound. Musculoskeletal:         General: No swelling or tenderness. Normal range of motion. Cervical back: Normal range of motion and neck supple. Right lower leg: No edema. Left lower leg: No edema. Comments: No joint erythema, swelling, tenderness. FROM upper and lower extremities, including shoulder, elbow, wrist, hip, knee, ankle, small joints of hands/feet. Lymphadenopathy:      Cervical: No cervical adenopathy. Skin:     General: Skin is warm. Capillary Refill: Capillary refill takes less than 2 seconds. Coloration: Skin is not jaundiced or pale. Findings: No erythema or rash. Neurological:      General: No focal deficit present. Mental Status: She is alert. Mental status is at baseline. Cranial Nerves: No cranial nerve deficit. Motor: No abnormal muscle tone. Coordination: Coordination normal.      Gait: Gait normal.             An electronic signature was used to authenticate this note.     --Dell Castelan MD

## 2023-02-07 DIAGNOSIS — R55 SYNCOPE AND COLLAPSE: ICD-10-CM

## 2023-02-23 ENCOUNTER — OFFICE VISIT (OUTPATIENT)
Dept: OBGYN | Age: 23
End: 2023-02-23
Payer: COMMERCIAL

## 2023-02-23 VITALS
SYSTOLIC BLOOD PRESSURE: 112 MMHG | HEIGHT: 66 IN | DIASTOLIC BLOOD PRESSURE: 76 MMHG | WEIGHT: 139 LBS | BODY MASS INDEX: 22.34 KG/M2

## 2023-02-23 DIAGNOSIS — Z30.46 NEXPLANON REMOVAL: Primary | ICD-10-CM

## 2023-02-23 PROCEDURE — 11982 REMOVE DRUG IMPLANT DEVICE: CPT | Performed by: ADVANCED PRACTICE MIDWIFE

## 2023-02-23 RX ORDER — ETONOGESTREL 68 MG/1
68 IMPLANT SUBCUTANEOUS ONCE
COMMUNITY

## 2023-02-23 NOTE — PROGRESS NOTES
25 y.o.  2023      Colt Mcgregor is a 25 y.o. female is requesting to have her Nexplanon removed. She does not have any other problems today. It was placed over 3 years ago and . She plans on using condoms for STD protection and family planning. Past Medical History:   Diagnosis Date    Acute gastroenteritis     Allergic     Anxiety     Asthma     Depression     Irregular menses     Panic attack     PTSD (post-traumatic stress disorder)          No past surgical history on file. Family History   Problem Relation Age of Onset    Heart Disease Mother         heart attacks    High Blood Pressure Mother     Depression Father     High Blood Pressure Father     Asthma Father     Allergies Father     Down Syndrome Sister     Other Sister         epilepsy;tracheal malasia; hyperthyroidism    Asthma Brother     Allergies Brother     Arrhythmia Sister     Anemia Maternal Grandmother        Social History     Tobacco Use    Smoking status: Never     Passive exposure: Yes    Smokeless tobacco: Never   Vaping Use    Vaping Use: Every day    Substances: Nicotine, Flavoring   Substance Use Topics    Alcohol use: Yes     Comment: Rare    Drug use: Yes     Types: Marijuana [facility])       Current Outpatient Medications on File Prior to Visit   Medication Sig Dispense Refill    etonogestrel (NEXPLANON) 68 MG implant 68 mg by Subdermal route once      hydrOXYzine HCl (ATARAX) 50 MG tablet Take 1 tablet by mouth every 6 hours as needed for Anxiety 20 tablet 0    ondansetron (ZOFRAN-ODT) 4 MG disintegrating tablet Take 1 tablet by mouth every 6 hours as needed for Nausea or Vomiting 20 tablet 0     No current facility-administered medications on file prior to visit.        Allergies as of 2023    (No Known Allergies)         OB History    Para Term  AB Living   0 0 0 0 0 0   SAB IAB Ectopic Molar Multiple Live Births   0 0 0   0           Blood pressure 112/76, height 5' 6\" (1.676 m), weight 139 lb (63 kg), not currently breastfeeding. PROCEDURE:  The patient was positioned comfortably on our procedure table. She was consented earlier in the appointment and the procedure risk and complications were reviewed. A sterile prep and drape was completed and local anesthetic was utilized. The skin had a small incision just beyond the proximal tip of the insert and utilizing blunt dissection the stylet was grasped and removed. A sterile dressing and steri-strip was applied with a pressure wrap. The patient tolerated the procedure well. Formal restrictions were discussed in detail. She is to notify our office if any swelling, redness, temperature, or limb restriction or numbness. No baths, Pools or Lakes until Follow up. Showers are allowed in 36 hours. She may take Tylenol for any pain. Assessment:   Diagnosis Orders   1. Nexplanon removal [Z30.46 (ICD-10-CM)]          Patient Active Problem List    Diagnosis Date Noted    Syncope and collapse 01/22/2023     Priority: Medium    Asthma 11/01/2018    Acute gastroenteritis 11/01/2018    Major depressive disorder 11/07/2016         Plan:  Follow up as needed  PT to return for change to contraceptive option if menstrual cycles are irregular.    Barrier recs

## 2023-03-09 ENCOUNTER — OFFICE VISIT (OUTPATIENT)
Dept: CARDIOLOGY CLINIC | Age: 23
End: 2023-03-09
Payer: COMMERCIAL

## 2023-03-09 VITALS
DIASTOLIC BLOOD PRESSURE: 76 MMHG | OXYGEN SATURATION: 99 % | HEIGHT: 66 IN | WEIGHT: 136.2 LBS | HEART RATE: 88 BPM | BODY MASS INDEX: 21.89 KG/M2 | SYSTOLIC BLOOD PRESSURE: 118 MMHG

## 2023-03-09 DIAGNOSIS — R55 SYNCOPE AND COLLAPSE: Primary | ICD-10-CM

## 2023-03-09 PROCEDURE — 1036F TOBACCO NON-USER: CPT | Performed by: INTERNAL MEDICINE

## 2023-03-09 PROCEDURE — G8420 CALC BMI NORM PARAMETERS: HCPCS | Performed by: INTERNAL MEDICINE

## 2023-03-09 PROCEDURE — G8484 FLU IMMUNIZE NO ADMIN: HCPCS | Performed by: INTERNAL MEDICINE

## 2023-03-09 PROCEDURE — G8427 DOCREV CUR MEDS BY ELIG CLIN: HCPCS | Performed by: INTERNAL MEDICINE

## 2023-03-09 PROCEDURE — 99213 OFFICE O/P EST LOW 20 MIN: CPT | Performed by: INTERNAL MEDICINE

## 2023-03-09 NOTE — PROGRESS NOTES
CARDIOLOGY  NOTE    Chief Complaint: Syncope    HPI:   Koby Welch is a 25y.o. year old who has Past medical history as noted below. Medicine comes in for follow-up after 30-day monitor showing episodes of sinus tachycardia which were associated with her reported symptoms of passing out syncope  Has longstanding history of syncope and family history of seizures  Was admitted in January after an episode of passing out where she was vomiting was nauseous after episode of diarrhea and then got lightheaded and dizzy and then passed out in the emergency department there was concern about her having possible sinus arrhythmias or sinus pauses? Her echo was normal 30-day monitor of as reported above showed episode of sinus tachycardia associated with 2 episodes of syncope      Current Outpatient Medications   Medication Sig Dispense Refill    hydrOXYzine HCl (ATARAX) 50 MG tablet Take 1 tablet by mouth every 6 hours as needed for Anxiety 20 tablet 0    ondansetron (ZOFRAN-ODT) 4 MG disintegrating tablet Take 1 tablet by mouth every 6 hours as needed for Nausea or Vomiting 20 tablet 0     No current facility-administered medications for this visit. Allergies:   Patient has no known allergies. Patient History:  Past Medical History:   Diagnosis Date    Acute gastroenteritis     Allergic     Anxiety     Asthma     Depression     Irregular menses     Panic attack     PTSD (post-traumatic stress disorder)      No past surgical history on file.   Family History   Problem Relation Age of Onset    Heart Disease Mother         heart attacks    High Blood Pressure Mother     Depression Father     High Blood Pressure Father     Asthma Father     Allergies Father     Down Syndrome Sister     Other Sister         epilepsy;tracheal malasia; hyperthyroidism    Asthma Brother     Allergies Brother     Arrhythmia Sister     Anemia Maternal Grandmother      Social History     Tobacco Use Smoking status: Never     Passive exposure: Yes    Smokeless tobacco: Never   Substance Use Topics    Alcohol use: Yes     Comment: Rare        Review of Systems:   Constitutional: No Fever or Weight Loss   Eyes: No Decreased Vision  ENT: No Headaches, Hearing Loss or Vertigo  Cardiovascular: as per note above   Respiratory: No cough or wheezing and as per note above. Gastrointestinal: No abdominal pain, appetite loss, blood in stools, constipation, diarrhea or heartburn  Genitourinary: No dysuria, trouble voiding, or hematuria  Musculoskeletal:  denies any new  joint aches , swelling  or pain   Integumentary: No rash or pruritis  Neurological: No TIA or stroke symptoms  Psychiatric: No anxiety or depression  Endocrine: No malaise, fatigue or temperature intolerance  Hematologic/Lymphatic: No bleeding problems, blood clots or swollen lymph nodes  Allergic/Immunologic: No nasal congestion or hives    Objective:      Physical Exam:  /76   Pulse 88   Ht 5' 6\" (1.676 m)   Wt 136 lb 3.2 oz (61.8 kg)   SpO2 99%   BMI 21.98 kg/m²   Wt Readings from Last 3 Encounters:   03/09/23 136 lb 3.2 oz (61.8 kg)   02/23/23 139 lb (63 kg)   01/22/23 170 lb (77.1 kg)     Body mass index is 21.98 kg/m². Vitals:    03/09/23 0807   BP: 118/76   Pulse: 88   SpO2: 99%        General Appearance:  No distress, conversant  Constitutional:  Well developed, Well nourished, No acute distress, Non-toxic appearance. HENT:  Normocephalic, Atraumatic, Bilateral external ears normal, Oropharynx moist, No oral exudates, Nose normal. Neck- Normal range of motion, No tenderness, Supple, No stridor,no apical-carotid delay  Eyes:  PERRL, EOMI, Conjunctiva normal, No discharge. Respiratory:  Normal breath sounds, No respiratory distress, No wheezing, No chest tenderness. ,no use of accessory muscles, NO crackles  Cardiovascular: (PMI) apex non displaced,no lifts no thrills,S1 and S2 audible, No added heart sounds, No signs of ankle edema, or volume overload, No evidence of JVD, No crackles   GI:  Bowel sounds normal, Soft, No tenderness, No masses, No gross visceromegaly   :  No costovertebral angle tenderness   Musculoskeletal:  No edema, no tenderness, no deformities. Back- no tenderness  Integument:  Well hydrated, no rash   Lymphatic:  No lymphadenopathy noted   Neurologic:  Alert & oriented x 3, CN 2-12 normal, normal motor function, normal sensory function, no focal deficits noted   Psychiatric:  Speech and behavior appropriate       Medical decision making and Data review:  DATA:  Lab Results   Component Value Date    TROPONINT <0.010 01/22/2023     BNP:    Lab Results   Component Value Date    PROBNP 149.9 01/22/2023     PT/INR:  No results found for: [employer]  Lab Results   Component Value Date    LABA1C 5.1 01/22/2023     Lab Results   Component Value Date    CHOL 128 01/22/2023    TRIG 50 01/22/2023    HDL 44 01/22/2023    LDLCALC 74 01/22/2023    LDLDIRECT 103 (H) 07/10/2015     Lab Results   Component Value Date    ALT 10 01/23/2023    AST 14 (L) 01/23/2023     No results for input(s): WBC, HGB, HCT, MCV, PLT in the last 72 hours. TSH: No results found for: TSH  Lab Results   Component Value Date    AST 14 (L) 01/23/2023    ALT 10 01/23/2023    BILIDIR 0.2 04/23/2015    BILITOT 0.6 01/23/2023    ALKPHOS 47 01/23/2023     Lab Results   Component Value Date    PROBNP 149.9 01/22/2023     Lab Results   Component Value Date    LABA1C 5.1 01/22/2023     Lab Results   Component Value Date    WBC 7.7 01/23/2023    HGB 12.5 01/23/2023    HCT 38.0 01/23/2023     01/23/2023     All labs, medications and tests reviewed by myself including data and history from outside source , patient and available family . Assessment & Plan:      1. Syncope and collapse         Syncope and collapse  She did not have any arrhythmias associated with 2 episodes of syncope while she was wearing the monitor and was associated with sinus tachycardia.   Referred to neurology for evaluation of possible seizure disorder  Shows no structural abnormality but will get a treadmill stress test to see if he can unmask any arrhythmia  Extensively counseled all data and imaging etc. were reviewed with patient and her boyfriend        Counseled extensively and medication compliance urged. We discussed that for the  prevention of ASCVD our  goal is aggressive risk modification. Patient is encouraged to exercise if they can , educated about  brisk walk for 30 minutes  at least 3 to 4 times a week if there are no physical limitations  Various goals were discussed and questions answered. Continue current medications. Appropriate prescriptions are addressed and refills ordered. Questions answered and patient verbalizes understanding. Call for any problems, questions, or concerns. Greater than 60 % of time spent counseling besides reviewing data and images     Continue all other medications of all above medical condition listed as is. Return in about 3 months (around 6/9/2023). Please note this report has been partially produced using speech recognition software and may contain errors related to that system including errors in grammar, punctuation, and spelling, as well as words and phrases that may be inappropriate. If there are any questions or concerns please feel free to contact the dictating provider for clarification.

## 2023-03-09 NOTE — ASSESSMENT & PLAN NOTE
She did not have any arrhythmias associated with 2 episodes of syncope while she was wearing the monitor and was associated with sinus tachycardia.   Referred to neurology for evaluation of possible seizure disorder  Shows no structural abnormality but will get a treadmill stress test to see if he can unmask any arrhythmia

## 2023-03-09 NOTE — LETTER
March 9, 2023       Kendra Harrison YOB: 2000   [place] [id number] Date of Visit:  3/9/2023       To Whom It May Concern: It is my medical opinion that Antelmo Ng should not drive until futher notice. .    If you have any questions or concerns, please don't hesitate to call.     Sincerely,          Russell Thomson MD

## 2023-03-16 ENCOUNTER — OFFICE VISIT (OUTPATIENT)
Dept: NEUROLOGY | Age: 23
End: 2023-03-16
Payer: COMMERCIAL

## 2023-03-16 ENCOUNTER — PROCEDURE VISIT (OUTPATIENT)
Dept: CARDIOLOGY CLINIC | Age: 23
End: 2023-03-16
Payer: COMMERCIAL

## 2023-03-16 VITALS
HEIGHT: 66 IN | BODY MASS INDEX: 21.69 KG/M2 | DIASTOLIC BLOOD PRESSURE: 70 MMHG | HEART RATE: 90 BPM | OXYGEN SATURATION: 97 % | SYSTOLIC BLOOD PRESSURE: 108 MMHG | WEIGHT: 135 LBS

## 2023-03-16 DIAGNOSIS — R55 SYNCOPE AND COLLAPSE: ICD-10-CM

## 2023-03-16 DIAGNOSIS — G60.9 IDIOPATHIC PERIPHERAL NEUROPATHY: Primary | ICD-10-CM

## 2023-03-16 DIAGNOSIS — R94.31 ABNORMAL EKG: Primary | ICD-10-CM

## 2023-03-16 DIAGNOSIS — R56.9 SEIZURE-LIKE ACTIVITY (HCC): ICD-10-CM

## 2023-03-16 DIAGNOSIS — E55.9 VITAMIN D DEFICIENCY: ICD-10-CM

## 2023-03-16 PROCEDURE — G8427 DOCREV CUR MEDS BY ELIG CLIN: HCPCS | Performed by: PSYCHIATRY & NEUROLOGY

## 2023-03-16 PROCEDURE — 93015 CV STRESS TEST SUPVJ I&R: CPT | Performed by: INTERNAL MEDICINE

## 2023-03-16 PROCEDURE — 99245 OFF/OP CONSLTJ NEW/EST HI 55: CPT | Performed by: PSYCHIATRY & NEUROLOGY

## 2023-03-16 PROCEDURE — G8484 FLU IMMUNIZE NO ADMIN: HCPCS | Performed by: PSYCHIATRY & NEUROLOGY

## 2023-03-16 PROCEDURE — G8420 CALC BMI NORM PARAMETERS: HCPCS | Performed by: PSYCHIATRY & NEUROLOGY

## 2023-03-16 PROCEDURE — 36415 COLL VENOUS BLD VENIPUNCTURE: CPT | Performed by: PSYCHIATRY & NEUROLOGY

## 2023-03-16 NOTE — PROGRESS NOTES
Labs drawn from [facility] with 22G. 2 SSTs and 1 lavender drawn and band aid placed at site. Pt tolerated well.

## 2023-03-16 NOTE — PROGRESS NOTES
3/16/23    [address] [facility]  2000    Chief Complaint   Patient presents with    New Patient     Syncope episodes after COVID 10/2021       History of Present Illness  Carolann Baum is a 25 y.o. female presenting today for evaluation of:  Syncope    She states that she went to the hospital in January due to significant vomiting and passing out spells. Prior to going to the hospital she was vomiting 4-5 times and passed out 4-5 times. She passed out in the emergency room. She had a pause on the monitor of about 10 seconds which had atrial beats but no ventricular rhythm. She had 30-day heart monitor on which was unremarkable. She tells me that she can feel the passing out spells come on. She feels a whole rest, over her body. She has palpitations. She has hot or cold sweats and tingling to her body. She will then lose consciousness. She is only out for seconds her fiancé tells me. She will be \"fuzzy\" for a moment. She is not disoriented to her surroundings or people around her. She has not had any long-lasting confusion after the loss of consciousness. She has not had bowel or bladder incontinence. She has not had any convulsions. She does not have any tongue biting associated with the spells. She has a family history of seizures in her 71-year-old sister who has Down syndrome. There is a family history of stress-induced seizures in her mother. She has a personal history of head trauma as a child due to a soccer ball hitting her in the head and also another incident where she hit heads on the playground in second grade which knocked her out. She has no history of stroke or brain bleed. She has never had meningitis. She has never had a history of seizure due to high fever as an infant. She states her last passout spell was 3 days ago when she was having a bowel movement. She also states that it happens after intercourse.       Subjective    Review of Symptoms:  Neurologic   Symptoms: dizziness, syncope, no difficulty with gait or walking, no bowel symptoms, no vertigo, no confusion, no memory loss, no speech disorder, no visual loss, no double vision, no loss of hearing, no sensory disturbances, no weakness, no headaches, no bladder symptoms, no seizures, and no excessive fatigue    Current Outpatient Medications   Medication Sig Dispense Refill    hydrOXYzine HCl (ATARAX) 50 MG tablet Take 1 tablet by mouth every 6 hours as needed for Anxiety 20 tablet 0    ondansetron (ZOFRAN-ODT) 4 MG disintegrating tablet Take 1 tablet by mouth every 6 hours as needed for Nausea or Vomiting 20 tablet 0     No current facility-administered medications for this visit. Past Medical History:   Diagnosis Date    Acute gastroenteritis     Allergic     Anxiety     Asthma     Depression     Irregular menses     Panic attack     PTSD (post-traumatic stress disorder)        No past surgical history on file. Social History     Socioeconomic History    Marital status: Single   Tobacco Use    Smoking status: Never     Passive exposure: Yes    Smokeless tobacco: Never   Vaping Use    Vaping Use: Every day    Substances: Nicotine, Flavoring   Substance and Sexual Activity    Alcohol use: Yes     Comment: Rare    Drug use: Yes     Types: Marijuana [employer])    Sexual activity: Yes     Partners: Male       Family History   Problem Relation Age of Onset    Heart Disease Mother         heart attacks    High Blood Pressure Mother     Depression Father     High Blood Pressure Father     Asthma Father     Allergies Father     Down Syndrome Sister     Other Sister         epilepsy;tracheal malasia; hyperthyroidism    Asthma Brother     Allergies Brother     Arrhythmia Sister     Anemia Maternal Grandmother        Objective    Physical Exam:  Also present during visit:  Redd Fraser .     Constitutional   Weight: well nourished  Heart/Vascular   Rate and Rhythm: RRR   Murmurs: none   Arterial Pulses:  no carotid bruits  Neck   Appearance/Palpation/Auscultation: supple  Mental Status   Orientation: oriented to person, oriented to place, oriented to problem, and oriented to time   Mood/Affect: appropriate mood and appropriate affect   Memory/Other: recent memory intact, remote memory intact, fund of knowledge intact, attention span normal, and concentration normal  Language   Language: (normal) language, no dysarthria, (normal) articulation, and no dysphasia/aphasia  Cranial Nerves   CN II Right: visual fields appear intact   CN II Left: visual fields appear intact   CN III, IV, VI: EOM no nystagmus, normal pursuit, and extraocular muscle strength normal   CN III: pupil normal size, pupil reactive to light and dark, pupil accomodates, and no ptosis   CN IV: normal   CN VI: normal   CN V Right: normal sensation and muscles of mastication intact   CN V Left: normal sensation and muscles of mastication intact   CN VII Right: normal facial expression   CN VII Left: normal facial expression   CN VIII Right: hearing in tact to normal conversation   CN VIII Left: hearing in tact to normal conversation   CN IX,X: normal palatal movement   CN XI Right: normal sternocleidomastoid and normal trapezius   CN XI Left: normal sternocleidomastoid and normal trapezius   CN XII: no tremors of the tongue, no fasciculation of the tongue, tongue protrudes midline, normal power to left, and normal power to right  Gait and Stance   Gait/Posture: station normal, ambulates independently, gait normal, and Romberg's test normal  Motor/Coordination Exam   General: no bradykinesia, no tremors, no chorea, no athetosis, no myoclonus, and no dyskinesia   Right Upper Extremity: normal motor strength, normal bulk, and normal tone   Left Upper Extremity: normal motor strength, normal bulk, and normal tone   Right Lower Extremity: normal motor strength, normal bulk, and normal tone   Left Lower Extremity: normal motor strength, normal bulk, and normal tone   Coordination: no drift, normal finger-to-nose, and rapid alternating movements normal  Reflexes   Reflexes Right: biceps 2/4, triceps 2/4, brachioradialis 2/4, and DTRs lower extremities non-reactive   Reflexes Left: biceps 2/4, triceps 2/4, brachioradialis 2/4, and DTRs lower extremities non-reactive   Plantar Reflex Right: response downgoing   Plantar Reflex Left: response downgoing   Hoffmans Reflex Right: absent   Hoffmans Reflex Left: absent  Sensory   Sensation: normal light touch, normal pinprick, normal temperature, normal DSS, no neglect, and decreased vibration lowers moderate  Spine   Cervical Spine: no tenderness, no dystonia , and full ROM   Thoracic Spine: no spasms, no bony abnormalities, normal curvature, no tenderness, and full ROM   Low Back: full ROM, no pain, no spasms, and no bony abnormalities  Lungs   Auscultation: normal breath sounds  Skin   Inspection: no jaundice, no lesions, no rashes, and no cyanosis      /70 (Site: Right Upper Arm, Position: Sitting, Cuff Size: Medium Adult)   Pulse 90   Ht 5' 6\" (1.676 m)   Wt 135 lb (61.2 kg)   SpO2 97%   BMI 21.79 kg/m²     Assessment and Plan     Diagnosis Orders   1. Idiopathic peripheral neuropathy  Vitamin B12 & Folate    Electrophoresis Protein, Serum    Vitamin D 25 Hydroxy    Nerve Conduction Test with EMG      2. Seizure-like activity (HCC)  EEG    MRI BRAIN W WO CONTRAST      3. Syncope and collapse  EEG    MRI BRAIN W WO CONTRAST          1) interestingly when I was examining Jackie I noted she did not have any lower extremity reflexes. Is a 52-year-old female this is quite abnormal.  Further on examination she had a very significant decrease in vibratory sense in her distal lower extremities. It appears she has a peripheral neuropathy. I will obtain an EMG and labs for potential reversible causes of neuropathy to evaluate this in better detail.   This may be contributing also to an autonomic neuropathy and a propensity for dysautonomia and syncope. I advised her to wear compression socks and stay well-hydrated. 2) my suspicion for seizure is quite low. The spells do not have any convulsions, other characteristics of seizure, or postictal period. Her fiancé does describe, though, some \"staring off\" type spells. This could be related to dysautonomia as well but I will obtain an EEG and MRI to evaluate this in better detail to ensure it does not represent a complex partial seizure. There is a family history of seizures in her sister but her sister does have Down syndrome which would put her at higher risk of seizures. I suspect her passing out spells are vasovagal in nature in the setting of dysautonomia as discussed above. Return in about 3 months (around 6/16/2023) for Follow-up me or AMALIA.     Arslan Mitchell DO

## 2023-03-17 ENCOUNTER — TELEPHONE (OUTPATIENT)
Dept: CARDIOLOGY CLINIC | Age: 23
End: 2023-03-17

## 2023-03-17 ENCOUNTER — TELEPHONE (OUTPATIENT)
Dept: NEUROLOGY | Age: 23
End: 2023-03-17

## 2023-03-17 LAB
25(OH)D3 SERPL-MCNC: 22.8 NG/ML
ALBUMIN SERPL ELPH-MCNC: 3.8 G/DL (ref 3.1–4.9)
ALPHA1 GLOB SERPL ELPH-MCNC: 0.2 G/DL (ref 0.2–0.4)
ALPHA2 GLOB SERPL ELPH-MCNC: 0.7 G/DL (ref 0.4–1.1)
B-GLOBULIN SERPL ELPH-MCNC: 1.2 G/DL (ref 0.9–1.6)
FOLATE SERPL-MCNC: 10.59 NG/ML (ref 4.78–24.2)
GAMMA GLOB SERPL ELPH-MCNC: 1.1 G/DL (ref 0.6–1.8)
PROT SERPL-MCNC: 7 G/DL (ref 6.4–8.2)
SPE/IFE INTERPRETATION: NORMAL
VIT B12 SERPL-MCNC: 378 PG/ML (ref 211–911)

## 2023-03-17 NOTE — TELEPHONE ENCOUNTER
Patient called in and stated the heart house physician went over the lab results, but could not advise how to increase her protein and vitamin D. Please advise.

## 2023-03-17 NOTE — TELEPHONE ENCOUNTER
----- Message from Jennifer Kumari MD sent at 3/17/2023  9:10 AM EDT -----  Vit d is low  Start replacement   Can take 10,000 units daily for 1 mth and then 1000 units daily   Lft msg on vm to return call. Spoke to pt regarding results of vit d.  Patient advised and voices understanding.'

## 2023-03-24 ENCOUNTER — HOSPITAL ENCOUNTER (OUTPATIENT)
Dept: SLEEP CENTER | Age: 23
Discharge: HOME | End: 2023-03-24
Payer: COMMERCIAL

## 2023-03-24 DIAGNOSIS — R56.9 SEIZURE-LIKE ACTIVITY (HCC): ICD-10-CM

## 2023-03-24 DIAGNOSIS — R55 SYNCOPE AND COLLAPSE: ICD-10-CM

## 2023-03-24 PROBLEM — G40.89 OTHER SEIZURES (HCC): Status: ACTIVE | Noted: 2023-03-24

## 2023-03-24 PROCEDURE — 95819 EEG AWAKE AND ASLEEP: CPT

## 2023-03-24 NOTE — TELEPHONE ENCOUNTER
Protein is actually normal.  It was low on 1/23/2023 in different lab work. Her vitamin D was low at 22.8. Typically I have patients with this degree of deficiency take 5000 IUs daily.

## 2023-03-24 NOTE — PROCEDURES
ROUTINE ELECTROENCEPHALOGRAM    Identifying Information:  Name: Lilli Rousseau  MRN: [id number]  : 2000  Interpreting Physician: Angelia Martinez DO  Referring Provider: Richard Shaffer DO  Date of EEG: 3/24/23  Procedure Location: Outpatient     Clinical History:  Lilli Rousseau is a 25 y.o. female with concerns for seizure like activity. Current Medications:   Current Outpatient Medications   Medication Instructions    hydrOXYzine HCl (ATARAX) 50 mg, Oral, EVERY 6 HOURS PRN    ondansetron (ZOFRAN-ODT) 4 mg, Oral, EVERY 6 HOURS PRN     Indication:  Rule out seizure/seizure disorder     Technical Summary:  28 channels of EEG were recorded in a digital format on a patient who was reported to be awake and drowsy during the recording. The patient was not sleep deprived prior to the EEG. The PDR consisted of well-developed, well-regulated 9-10 Hz alpha activity, maximal over the posterior head regions and reactive to eye opening and closure. Photic stimulation was performed and did not produce any abnormalities. During the recording stage II sleep was not seen, but drowsiness did occur. The EKG lead revealed no rhythm abnormalities. EEG Interpretation: This EEG was within normal limits for a patient of this age in the awake and drowsy states. No focal, lateralizing, or epileptiform features were seen during the recording. Clinical correlation is recommended.     Angelia Martinez DO  Epileptologist  3/24/2023 6:45 PM

## 2023-03-25 ENCOUNTER — HOSPITAL ENCOUNTER (OUTPATIENT)
Dept: MRI IMAGING | Age: 23
Discharge: HOME | End: 2023-03-25

## 2023-03-25 DIAGNOSIS — R56.9 SEIZURE-LIKE ACTIVITY (HCC): ICD-10-CM

## 2023-03-25 DIAGNOSIS — R55 SYNCOPE AND COLLAPSE: ICD-10-CM

## 2023-03-27 ENCOUNTER — TELEPHONE (OUTPATIENT)
Dept: NEUROLOGY | Age: 23
End: 2023-03-27

## 2023-03-27 NOTE — TELEPHONE ENCOUNTER
I believe per the chart, the cardiologist who ordered the labs has been discussing the results with her and coming up with a plan as far as vitamins specifically vitamin D. So we will let them handle it from here.

## 2023-03-27 NOTE — TELEPHONE ENCOUNTER
Patient called in stating she got her MRI and EEG done end of last week and over the weekend. Patient would like the results and to know whether the driving restrictions can be lifted. Please advise.

## 2023-03-30 ENCOUNTER — OFFICE VISIT (OUTPATIENT)
Dept: FAMILY MEDICINE CLINIC | Age: 23
End: 2023-03-30
Payer: COMMERCIAL

## 2023-03-30 VITALS
BODY MASS INDEX: 22.24 KG/M2 | WEIGHT: 137.8 LBS | RESPIRATION RATE: 16 BRPM | TEMPERATURE: 98.6 F | SYSTOLIC BLOOD PRESSURE: 116 MMHG | HEART RATE: 88 BPM | DIASTOLIC BLOOD PRESSURE: 64 MMHG | OXYGEN SATURATION: 98 %

## 2023-03-30 DIAGNOSIS — F33.2 SEVERE EPISODE OF RECURRENT MAJOR DEPRESSIVE DISORDER, WITHOUT PSYCHOTIC FEATURES (HCC): Primary | ICD-10-CM

## 2023-03-30 DIAGNOSIS — R55 SYNCOPE AND COLLAPSE: ICD-10-CM

## 2023-03-30 DIAGNOSIS — G60.9 IDIOPATHIC PERIPHERAL NEUROPATHY: ICD-10-CM

## 2023-03-30 PROBLEM — K52.9 ACUTE GASTROENTERITIS: Status: RESOLVED | Noted: 2018-11-01 | Resolved: 2023-03-30

## 2023-03-30 PROBLEM — Z87.09 HISTORY OF ASTHMA: Status: ACTIVE | Noted: 2018-11-01

## 2023-03-30 PROCEDURE — G8420 CALC BMI NORM PARAMETERS: HCPCS | Performed by: PHYSICIAN ASSISTANT

## 2023-03-30 PROCEDURE — G8427 DOCREV CUR MEDS BY ELIG CLIN: HCPCS | Performed by: PHYSICIAN ASSISTANT

## 2023-03-30 PROCEDURE — G8484 FLU IMMUNIZE NO ADMIN: HCPCS | Performed by: PHYSICIAN ASSISTANT

## 2023-03-30 PROCEDURE — 1036F TOBACCO NON-USER: CPT | Performed by: PHYSICIAN ASSISTANT

## 2023-03-30 PROCEDURE — 99204 OFFICE O/P NEW MOD 45 MIN: CPT | Performed by: PHYSICIAN ASSISTANT

## 2023-03-30 SDOH — ECONOMIC STABILITY: INCOME INSECURITY: HOW HARD IS IT FOR YOU TO PAY FOR THE VERY BASICS LIKE FOOD, HOUSING, MEDICAL CARE, AND HEATING?: VERY HARD

## 2023-03-30 SDOH — HEALTH STABILITY: PHYSICAL HEALTH: ON AVERAGE, HOW MANY DAYS PER WEEK DO YOU ENGAGE IN MODERATE TO STRENUOUS EXERCISE (LIKE A BRISK WALK)?: 4 DAYS

## 2023-03-30 SDOH — ECONOMIC STABILITY: FOOD INSECURITY: WITHIN THE PAST 12 MONTHS, YOU WORRIED THAT YOUR FOOD WOULD RUN OUT BEFORE YOU GOT MONEY TO BUY MORE.: NEVER TRUE

## 2023-03-30 SDOH — ECONOMIC STABILITY: FOOD INSECURITY: WITHIN THE PAST 12 MONTHS, THE FOOD YOU BOUGHT JUST DIDN'T LAST AND YOU DIDN'T HAVE MONEY TO GET MORE.: NEVER TRUE

## 2023-03-30 SDOH — HEALTH STABILITY: PHYSICAL HEALTH: ON AVERAGE, HOW MANY MINUTES DO YOU ENGAGE IN EXERCISE AT THIS LEVEL?: 60 MIN

## 2023-03-30 SDOH — ECONOMIC STABILITY: HOUSING INSECURITY
IN THE LAST 12 MONTHS, WAS THERE A TIME WHEN YOU DID NOT HAVE A STEADY PLACE TO SLEEP OR SLEPT IN A SHELTER (INCLUDING NOW)?: NO

## 2023-03-30 ASSESSMENT — PATIENT HEALTH QUESTIONNAIRE - PHQ9
1. LITTLE INTEREST OR PLEASURE IN DOING THINGS: 2
8. MOVING OR SPEAKING SO SLOWLY THAT OTHER PEOPLE COULD HAVE NOTICED. OR THE OPPOSITE, BEING SO FIGETY OR RESTLESS THAT YOU HAVE BEEN MOVING AROUND A LOT MORE THAN USUAL: 1
SUM OF ALL RESPONSES TO PHQ QUESTIONS 1-9: 18
SUM OF ALL RESPONSES TO PHQ QUESTIONS 1-9: 18
SUM OF ALL RESPONSES TO PHQ9 QUESTIONS 1 & 2: 3
SUM OF ALL RESPONSES TO PHQ QUESTIONS 1-9: 18
2. FEELING DOWN, DEPRESSED OR HOPELESS: 1
5. POOR APPETITE OR OVEREATING: 3
3. TROUBLE FALLING OR STAYING ASLEEP: 3
6. FEELING BAD ABOUT YOURSELF - OR THAT YOU ARE A FAILURE OR HAVE LET YOURSELF OR YOUR FAMILY DOWN: 3
10. IF YOU CHECKED OFF ANY PROBLEMS, HOW DIFFICULT HAVE THESE PROBLEMS MADE IT FOR YOU TO DO YOUR WORK, TAKE CARE OF THINGS AT HOME, OR GET ALONG WITH OTHER PEOPLE: 1
7. TROUBLE CONCENTRATING ON THINGS, SUCH AS READING THE NEWSPAPER OR WATCHING TELEVISION: 2
4. FEELING TIRED OR HAVING LITTLE ENERGY: 3
9. THOUGHTS THAT YOU WOULD BE BETTER OFF DEAD, OR OF HURTING YOURSELF: 0
SUM OF ALL RESPONSES TO PHQ QUESTIONS 1-9: 18

## 2023-03-30 ASSESSMENT — SOCIAL DETERMINANTS OF HEALTH (SDOH)
WITHIN THE LAST YEAR, HAVE YOU BEEN KICKED, HIT, SLAPPED, OR OTHERWISE PHYSICALLY HURT BY YOUR PARTNER OR EX-PARTNER?: NO
WITHIN THE LAST YEAR, HAVE YOU BEEN AFRAID OF YOUR PARTNER OR EX-PARTNER?: NO
WITHIN THE LAST YEAR, HAVE TO BEEN RAPED OR FORCED TO HAVE ANY KIND OF SEXUAL ACTIVITY BY YOUR PARTNER OR EX-PARTNER?: NO
WITHIN THE LAST YEAR, HAVE YOU BEEN HUMILIATED OR EMOTIONALLY ABUSED IN OTHER WAYS BY YOUR PARTNER OR EX-PARTNER?: NO

## 2023-03-30 NOTE — PROGRESS NOTES
0 0 7     Interpretation of Total Score Depression Severity: 1-4 = Minimal depression, 5-9 = Mild depression, 10-14 = Moderate depression, 15-19 = Moderately severe depression, 20-27 = Severe depression     Current Outpatient Medications on File Prior to Visit   Medication Sig Dispense Refill    hydrOXYzine HCl (ATARAX) 50 MG tablet Take 1 tablet by mouth every 6 hours as needed for Anxiety 20 tablet 0    ondansetron (ZOFRAN-ODT) 4 MG disintegrating tablet Take 1 tablet by mouth every 6 hours as needed for Nausea or Vomiting 20 tablet 0     No current facility-administered medications on file prior to visit. No Known Allergies    Past Medical History:   Diagnosis Date    Acute gastroenteritis     Acute gastroenteritis 11/1/2018    Allergic     Anxiety     Asthma     Depression     H/O exercise stress test 03/16/2023    Normal stress test , Completed 13.4 METS and 11 Mins of exercise Bishop Score of 11 ( low Risk) 2 PVC noted at peak exercise but not in recovery    Irregular menses     Other seizures ([address] 3/24/2023    Panic attack     PTSD (post-traumatic stress disorder)        History reviewed. No pertinent surgical history.     Social History     Socioeconomic History    Marital status: Single     Spouse name: None    Number of children: None    Years of education: None    Highest education level: None   Tobacco Use    Smoking status: Never     Passive exposure: Yes    Smokeless tobacco: Never   Vaping Use    Vaping Use: Every day    Substances: Nicotine, Flavoring   Substance and Sexual Activity    Alcohol use: Yes     Comment: Rare    Drug use: Yes     Types: Marijuana Michael Sorensen)    Sexual activity: Yes     Partners: Male     Social Determinants of Health     Financial Resource Strain: High Risk    Difficulty of Paying Living Expenses: Very hard   Food Insecurity: No Food Insecurity    Worried About Running Out of Food in the Last Year: Never true    Ran Out of Food in the Last Year: Never true   Transportation

## 2023-03-30 NOTE — TELEPHONE ENCOUNTER
MRI of the brain and EEG are normal.  From my standpoint driving restrictions can be lifted. She will also need to get permission to drive from cardiology.

## 2023-03-30 NOTE — PATIENT INSTRUCTIONS
We are committed to providing you the best care possible. If you receive a survey after visiting one of our offices, please take time to share your experience concerning your physician office visit. These surveys are confidential and no health information about you is shared. We are eager to improve for you and continue to give you satisfactory care, we are counting on your feedback to help make that happen. [address] (ROMA/PIPP): What they offer: [address] assistance  Phone Numbers: [phone] or [phone]  Website: https://sole.kae/   [facility] Action:   Phone Numbers: 709.190.1976 or 644-445-4248  [employer] (Román/Roberto/Pat/Don): What they offer: free connection to hundreds of helpful area resources. When you call us, well arrange a one-on-one meeting in your home so we can create a personalized plan and find the assistance that you need. Phone Number: [phone]  Website: https://iuuj8cjnmnql. [address] [place] [address] ([address] [place] [address]): What they offer: Temporary cash assistance to needy families. Phone Number: 572.441.3459  Instructions: Email completed application to Concepcion@[employer]. [place].gov   Website: [employer]. org/food-assistance.html    Need additional resources? Call 211   Find Help https://www. findhelp.org

## 2023-03-31 ENCOUNTER — TELEPHONE (OUTPATIENT)
Dept: CARDIOLOGY CLINIC | Age: 23
End: 2023-03-31

## 2023-03-31 PROBLEM — G60.9 IDIOPATHIC PERIPHERAL NEUROPATHY: Status: ACTIVE | Noted: 2023-03-31

## 2023-03-31 ASSESSMENT — ENCOUNTER SYMPTOMS
COUGH: 0
SHORTNESS OF BREATH: 0
ABDOMINAL PAIN: 0

## 2023-03-31 NOTE — TELEPHONE ENCOUNTER
Called patient and left a message to go over results and that our office deems the driving restrictions lifted, but to follow up with the other specialists who are treating her as well on any restrictions they have. Also sent through [employer]. Statement Selected

## 2023-03-31 NOTE — TELEPHONE ENCOUNTER
Patient called and is asking for clearance to drive. We gave her a letter not to drive until further notice. She was having Syncope. She did see the neurologist.     This is Dr Olguin Necessary note MRI of the brain and EEG are normal.  From my standpoint driving restrictions can be lifted. She will also need to get permission to drive from cardiology. I will send to Dr Cardenas Signs to review.

## 2023-03-31 NOTE — ASSESSMENT & PLAN NOTE
Will start vraylar, pt advised to contact Moreno madrid as she has seen her in past year, pt given handout for local [facility] services, ER if worse/SI

## 2023-04-05 NOTE — TELEPHONE ENCOUNTER
Letter generated and I contacted [address] she has already received it on [employer].

## 2023-05-06 ENCOUNTER — HOSPITAL ENCOUNTER (EMERGENCY)
Age: 23
Discharge: HOME | End: 2023-05-06
Attending: STUDENT IN AN ORGANIZED HEALTH CARE EDUCATION/TRAINING PROGRAM
Payer: COMMERCIAL

## 2023-05-06 VITALS
SYSTOLIC BLOOD PRESSURE: 109 MMHG | OXYGEN SATURATION: 100 % | DIASTOLIC BLOOD PRESSURE: 77 MMHG | HEART RATE: 87 BPM | TEMPERATURE: 98.6 F | RESPIRATION RATE: 20 BRPM

## 2023-05-06 DIAGNOSIS — R11.2 NAUSEA AND VOMITING, UNSPECIFIED VOMITING TYPE: ICD-10-CM

## 2023-05-06 DIAGNOSIS — F10.920 ACUTE ALCOHOLIC INTOXICATION WITHOUT COMPLICATION (HCC): Primary | ICD-10-CM

## 2023-05-06 LAB
CHP ED QC CHECK: NORMAL
GLUCOSE BLD-MCNC: 94 MG/DL
GLUCOSE BLD-MCNC: 94 MG/DL (ref 70–99)

## 2023-05-06 PROCEDURE — 2580000003 HC RX 258: Performed by: STUDENT IN AN ORGANIZED HEALTH CARE EDUCATION/TRAINING PROGRAM

## 2023-05-06 PROCEDURE — 96375 TX/PRO/DX INJ NEW DRUG ADDON: CPT

## 2023-05-06 PROCEDURE — 82962 GLUCOSE BLOOD TEST: CPT

## 2023-05-06 PROCEDURE — 96366 THER/PROPH/DIAG IV INF ADDON: CPT

## 2023-05-06 PROCEDURE — 99284 EMERGENCY DEPT VISIT MOD MDM: CPT

## 2023-05-06 PROCEDURE — 2500000003 HC RX 250 WO HCPCS: Performed by: STUDENT IN AN ORGANIZED HEALTH CARE EDUCATION/TRAINING PROGRAM

## 2023-05-06 PROCEDURE — 96365 THER/PROPH/DIAG IV INF INIT: CPT

## 2023-05-06 PROCEDURE — 6360000002 HC RX W HCPCS: Performed by: STUDENT IN AN ORGANIZED HEALTH CARE EDUCATION/TRAINING PROGRAM

## 2023-05-06 RX ORDER — 0.9 % SODIUM CHLORIDE 0.9 %
1000 INTRAVENOUS SOLUTION INTRAVENOUS ONCE
Status: DISCONTINUED | OUTPATIENT
Start: 2023-05-06 | End: 2023-05-06 | Stop reason: HOSPADM

## 2023-05-06 RX ORDER — THIAMINE HYDROCHLORIDE 100 MG/ML
100 INJECTION, SOLUTION INTRAMUSCULAR; INTRAVENOUS ONCE
Status: COMPLETED | OUTPATIENT
Start: 2023-05-06 | End: 2023-05-06

## 2023-05-06 RX ORDER — ONDANSETRON 4 MG/1
4 TABLET, ORALLY DISINTEGRATING ORAL EVERY 8 HOURS PRN
Qty: 20 TABLET | Refills: 0 | Status: SHIPPED | OUTPATIENT
Start: 2023-05-06

## 2023-05-06 RX ORDER — ONDANSETRON 4 MG/1
4 TABLET, ORALLY DISINTEGRATING ORAL EVERY 6 HOURS PRN
Qty: 20 TABLET | Refills: 0 | Status: SHIPPED | OUTPATIENT
Start: 2023-05-06 | End: 2023-05-06 | Stop reason: SDUPTHER

## 2023-05-06 RX ORDER — FOLIC ACID 5 MG/ML
1 INJECTION, SOLUTION INTRAMUSCULAR; INTRAVENOUS; SUBCUTANEOUS ONCE
Status: COMPLETED | OUTPATIENT
Start: 2023-05-06 | End: 2023-05-06

## 2023-05-06 RX ORDER — ONDANSETRON 2 MG/ML
4 INJECTION INTRAMUSCULAR; INTRAVENOUS ONCE
Status: COMPLETED | OUTPATIENT
Start: 2023-05-06 | End: 2023-05-06

## 2023-05-06 RX ADMIN — FOLIC ACID: 5 INJECTION, SOLUTION INTRAMUSCULAR; INTRAVENOUS; SUBCUTANEOUS at 15:11

## 2023-05-06 RX ADMIN — ONDANSETRON 4 MG: 2 INJECTION INTRAMUSCULAR; INTRAVENOUS at 15:01

## 2023-05-06 RX ADMIN — THIAMINE HYDROCHLORIDE 100 MG: 100 INJECTION, SOLUTION INTRAMUSCULAR; INTRAVENOUS at 15:07

## 2023-05-06 RX ADMIN — FOLIC ACID 1 MG: 5 INJECTION, SOLUTION INTRAMUSCULAR; INTRAVENOUS; SUBCUTANEOUS at 15:07

## 2023-05-06 NOTE — ED PROVIDER NOTES
Emergency Department Encounter    Patient: Morelia Conroy  MRN: [id number]  : 2000  Date of Evaluation: 2023  ED Provider:  Jose Luis Mercado DO    Triage Chief Complaint:   No chief complaint on file. [place]:  Morelia Conroy is a 25 y.o. female that presents to the ED with a history of nausea and vomiting. Patient endorses multiple episodes of nonbloody and nonbilious vomiting since yesterday. Patient states she had excessive alcohol yesterday and has been vomiting since then. No history of dizziness/lightheadedness, abdominal pain, fever, chest pain, or shortness of breath. No back pain. No focal neurologic deficits. ROS - see HPI, below listed is current ROS at time of my eval:  Review of Systems    ROS is an in history; otherwise unremarkable    Past Medical History:   Diagnosis Date    Acute gastroenteritis     Acute gastroenteritis 2018    Allergic     Anxiety     Asthma     Depression     H/O exercise stress test 2023    Normal stress test , Completed 13.4 METS and 11 Mins of exercise Bishop Score of 11 ( low Risk) 2 PVC noted at peak exercise but not in recovery    Irregular menses     Other seizures ([facility] [address] 3/24/2023    Panic attack     PTSD (post-traumatic stress disorder)      No past surgical history on file.   Family History   Problem Relation Age of Onset    Heart Disease Mother         heart attacks    High Blood Pressure Mother     Depression Father     High Blood Pressure Father     Asthma Father     Allergies Father     Down Syndrome Sister     Other Sister         epilepsy;tracheal malasia; hyperthyroidism    Asthma Brother     Allergies Brother     Arrhythmia Sister     Anemia Maternal Grandmother      Social History     Socioeconomic History    Marital status: Single     Spouse name: Not on file    Number of children: Not on file    Years of education: Not on file    Highest education level: Not on file   Occupational History    Not on file

## 2023-05-10 ENCOUNTER — PROCEDURE VISIT (OUTPATIENT)
Dept: NEUROLOGY | Age: 23
End: 2023-05-10
Payer: COMMERCIAL

## 2023-05-10 DIAGNOSIS — G60.9 IDIOPATHIC PERIPHERAL NEUROPATHY: ICD-10-CM

## 2023-05-10 PROCEDURE — 95886 MUSC TEST DONE W/N TEST COMP: CPT | Performed by: STUDENT IN AN ORGANIZED HEALTH CARE EDUCATION/TRAINING PROGRAM

## 2023-05-10 PROCEDURE — 95912 NRV CNDJ TEST 11-12 STUDIES: CPT | Performed by: STUDENT IN AN ORGANIZED HEALTH CARE EDUCATION/TRAINING PROGRAM

## 2023-05-10 NOTE — PROGRESS NOTES
EMG/NCS Bilateral Lower Extremities    Reason for referral/Clinical data:    Gonzalez Finn presents today for bilateral lower extremity EMG to assess gait instability and numbness that was observed in her feet on neurologic exam.  She tells me that symptoms have been present for approximately 5 months. Refer to the Electrodiagnostic Data Sheet for normative values, specific techniques utilized for conductions, the numeric values obtained on nerve conductions, and specific muscles sampled for needle examination. Informed consent was given by the patient after discussion of the following - Expected potential benefits of procedure include the following: identifying diagnoses, excluding diagnoses, and helping the treating practitioners to prescribe treatment, testing, and follow-up. Possible adverse events of electrodiagnostic testing include local discomfort, mild bleeding or bruising (common) and rare/uncommon events such as infection, nausea, fainting, or other idiosyncratic adverse events.     Motor studies:  -- Right tibial motor response demonstrates amplitude which is normal, distal latency which is normal, and conduction velocity which is normal.   -- Left tibial motor response demonstrates amplitude which is normal, distal latency which is normal, and conduction velocity which is normal.  -- Right peroneal motor response demonstrates amplitude which is normal, distal latency which is normal, and conduction velocity which is normal.   -- Left peroneal motor response demonstrates amplitude which is normal, distal latency which is normal, and conduction velocity which is normal.     Sensory studies:  -- Right Superficial Peroneal sensory nerve conduction response demonstrates amplitude which is normal, and distal latency which is normal.   -- Left Superficial Peroneal sensory nerve conduction response demonstrates normal amplitude, and distal latency which is normal.   -- Right sural sensory nerve conduction

## 2023-07-12 ENCOUNTER — APPOINTMENT (OUTPATIENT)
Dept: CT IMAGING | Age: 23
End: 2023-07-12
Payer: COMMERCIAL

## 2023-07-12 ENCOUNTER — HOSPITAL ENCOUNTER (EMERGENCY)
Age: 23
Discharge: HOME | End: 2023-07-12
Attending: EMERGENCY MEDICINE
Payer: COMMERCIAL

## 2023-07-12 VITALS
HEART RATE: 90 BPM | TEMPERATURE: 98.2 F | DIASTOLIC BLOOD PRESSURE: 74 MMHG | WEIGHT: 135 LBS | SYSTOLIC BLOOD PRESSURE: 112 MMHG | HEIGHT: 65 IN | RESPIRATION RATE: 16 BRPM | BODY MASS INDEX: 22.49 KG/M2 | OXYGEN SATURATION: 100 %

## 2023-07-12 DIAGNOSIS — R55 SYNCOPE AND COLLAPSE: Primary | ICD-10-CM

## 2023-07-12 DIAGNOSIS — S00.83XA CONTUSION OF FACE, INITIAL ENCOUNTER: ICD-10-CM

## 2023-07-12 DIAGNOSIS — R11.2 NAUSEA AND VOMITING, UNSPECIFIED VOMITING TYPE: ICD-10-CM

## 2023-07-12 LAB
ALBUMIN SERPL-MCNC: 4.1 GM/DL (ref 3.4–5)
ALP BLD-CCNC: 42 IU/L (ref 40–129)
ALT SERPL-CCNC: 14 U/L (ref 10–40)
ANION GAP SERPL CALCULATED.3IONS-SCNC: 8 MMOL/L (ref 4–16)
AST SERPL-CCNC: 19 IU/L (ref 15–37)
BASOPHILS ABSOLUTE: 0.1 K/CU MM
BASOPHILS RELATIVE PERCENT: 0.8 % (ref 0–1)
BILIRUB SERPL-MCNC: 0.4 MG/DL (ref 0–1)
BILIRUBIN URINE: NEGATIVE MG/DL
BLOOD, URINE: NEGATIVE
BUN SERPL-MCNC: 12 MG/DL (ref 6–23)
CALCIUM SERPL-MCNC: 9.1 MG/DL (ref 8.3–10.6)
CHLORIDE BLD-SCNC: 107 MMOL/L (ref 99–110)
CLARITY: CLEAR
CO2: 25 MMOL/L (ref 21–32)
COLOR: YELLOW
COMMENT UA: NORMAL
CREAT SERPL-MCNC: 0.7 MG/DL (ref 0.6–1.1)
DIFFERENTIAL TYPE: ABNORMAL
EOSINOPHILS ABSOLUTE: 0.6 K/CU MM
EOSINOPHILS RELATIVE PERCENT: 7.5 % (ref 0–3)
GFR SERPL CREATININE-BSD FRML MDRD: >60 ML/MIN/1.73M2
GLUCOSE SERPL-MCNC: 95 MG/DL (ref 70–99)
GLUCOSE, URINE: NEGATIVE MG/DL
HCT VFR BLD CALC: 43.4 % (ref 37–47)
HEMOGLOBIN: 14 GM/DL (ref 12.5–16)
IMMATURE NEUTROPHIL %: 0.3 % (ref 0–0.43)
INTERPRETATION: NORMAL
KETONES, URINE: NEGATIVE MG/DL
LEUKOCYTE ESTERASE, URINE: NEGATIVE
LYMPHOCYTES ABSOLUTE: 2.1 K/CU MM
LYMPHOCYTES RELATIVE PERCENT: 28 % (ref 24–44)
MCH RBC QN AUTO: 29.6 PG (ref 27–31)
MCHC RBC AUTO-ENTMCNC: 32.3 % (ref 32–36)
MCV RBC AUTO: 91.8 FL (ref 78–100)
MONOCYTES ABSOLUTE: 0.5 K/CU MM
MONOCYTES RELATIVE PERCENT: 7.3 % (ref 0–4)
NITRITE URINE, QUANTITATIVE: NEGATIVE
PDW BLD-RTO: 13.3 % (ref 11.7–14.9)
PH, URINE: 5.5 (ref 5–8)
PLATELET # BLD: 263 K/CU MM (ref 140–440)
PMV BLD AUTO: 11.4 FL (ref 7.5–11.1)
POTASSIUM SERPL-SCNC: 3.8 MMOL/L (ref 3.5–5.1)
PREGNANCY, URINE: NEGATIVE
PROTEIN UA: NEGATIVE MG/DL
RBC # BLD: 4.73 M/CU MM (ref 4.2–5.4)
SEGMENTED NEUTROPHILS ABSOLUTE COUNT: 4.1 K/CU MM
SEGMENTED NEUTROPHILS RELATIVE PERCENT: 56.1 % (ref 36–66)
SODIUM BLD-SCNC: 140 MMOL/L (ref 135–145)
SPECIFIC GRAVITY UA: >1.03 (ref 1–1.03)
TOTAL IMMATURE NEUTOROPHIL: 0.02 K/CU MM
TOTAL PROTEIN: 6.7 GM/DL (ref 6.4–8.2)
UROBILINOGEN, URINE: 0.2 MG/DL (ref 0.2–1)
WBC # BLD: 7.4 K/CU MM (ref 4–10.5)

## 2023-07-12 PROCEDURE — 70450 CT HEAD/BRAIN W/O DYE: CPT

## 2023-07-12 PROCEDURE — 2580000003 HC RX 258: Performed by: EMERGENCY MEDICINE

## 2023-07-12 PROCEDURE — 80053 COMPREHEN METABOLIC PANEL: CPT

## 2023-07-12 PROCEDURE — 99284 EMERGENCY DEPT VISIT MOD MDM: CPT

## 2023-07-12 PROCEDURE — 96374 THER/PROPH/DIAG INJ IV PUSH: CPT

## 2023-07-12 PROCEDURE — 96375 TX/PRO/DX INJ NEW DRUG ADDON: CPT

## 2023-07-12 PROCEDURE — 81025 URINE PREGNANCY TEST: CPT

## 2023-07-12 PROCEDURE — 85025 COMPLETE CBC W/AUTO DIFF WBC: CPT

## 2023-07-12 PROCEDURE — 6360000002 HC RX W HCPCS: Performed by: EMERGENCY MEDICINE

## 2023-07-12 PROCEDURE — 81003 URINALYSIS AUTO W/O SCOPE: CPT

## 2023-07-12 PROCEDURE — 70486 CT MAXILLOFACIAL W/O DYE: CPT

## 2023-07-12 RX ORDER — PROCHLORPERAZINE EDISYLATE 5 MG/ML
5 INJECTION INTRAMUSCULAR; INTRAVENOUS ONCE
Status: COMPLETED | OUTPATIENT
Start: 2023-07-12 | End: 2023-07-12

## 2023-07-12 RX ORDER — PROMETHAZINE HYDROCHLORIDE 25 MG/1
25 TABLET ORAL EVERY 6 HOURS PRN
Qty: 20 TABLET | Refills: 0 | Status: SHIPPED | OUTPATIENT
Start: 2023-07-12 | End: 2023-07-19

## 2023-07-12 RX ORDER — 0.9 % SODIUM CHLORIDE 0.9 %
1000 INTRAVENOUS SOLUTION INTRAVENOUS ONCE
Status: COMPLETED | OUTPATIENT
Start: 2023-07-12 | End: 2023-07-12

## 2023-07-12 RX ORDER — ONDANSETRON 2 MG/ML
4 INJECTION INTRAMUSCULAR; INTRAVENOUS EVERY 30 MIN PRN
Status: DISCONTINUED | OUTPATIENT
Start: 2023-07-12 | End: 2023-07-12 | Stop reason: HOSPADM

## 2023-07-12 RX ORDER — SODIUM CHLORIDE 9 MG/ML
1000 INJECTION, SOLUTION INTRAVENOUS CONTINUOUS
Status: DISCONTINUED | OUTPATIENT
Start: 2023-07-12 | End: 2023-07-12 | Stop reason: HOSPADM

## 2023-07-12 RX ADMIN — PROCHLORPERAZINE EDISYLATE 5 MG: 5 INJECTION INTRAMUSCULAR; INTRAVENOUS at 07:45

## 2023-07-12 RX ADMIN — ONDANSETRON 4 MG: 2 INJECTION INTRAMUSCULAR; INTRAVENOUS at 07:42

## 2023-07-12 RX ADMIN — SODIUM CHLORIDE 1000 ML: 9 INJECTION, SOLUTION INTRAVENOUS at 07:38

## 2023-07-12 ASSESSMENT — PAIN DESCRIPTION - FREQUENCY: FREQUENCY: CONTINUOUS

## 2023-07-12 ASSESSMENT — PAIN - FUNCTIONAL ASSESSMENT: PAIN_FUNCTIONAL_ASSESSMENT: 0-10

## 2023-07-12 ASSESSMENT — LIFESTYLE VARIABLES: HOW OFTEN DO YOU HAVE A DRINK CONTAINING ALCOHOL: NEVER

## 2023-07-12 ASSESSMENT — PAIN DESCRIPTION - ORIENTATION: ORIENTATION: LEFT

## 2023-07-12 ASSESSMENT — PAIN DESCRIPTION - LOCATION: LOCATION: HEAD

## 2023-07-12 ASSESSMENT — PAIN SCALES - GENERAL: PAINLEVEL_OUTOF10: 8

## 2023-07-12 ASSESSMENT — PAIN DESCRIPTION - PAIN TYPE: TYPE: ACUTE PAIN

## 2023-07-12 ASSESSMENT — PAIN DESCRIPTION - DESCRIPTORS: DESCRIPTORS: SHARP;THROBBING

## 2023-07-12 NOTE — ED PROVIDER NOTES
Emergency Department Encounter  Location: [address]    Patient: Phil Kim  MRN: [id number]  : 2000  Date of evaluation: 2023  ED Provider: Rosendo Cardozo DO, FACEP    Chief Complaint:    Emesis and Loss of Consciousness (Pt arrives ambulatory with fiance stating was driving to work and felt nauseous. When arriving to work pt had emesis in parking lot,  Pt states she passes out when she vomits and she was sitting in car and woke with head on steering wheel. Pt states she vomited x3 and while driving back home she felt like she was going to pass out again and pulled over. Pt states she passed out again. Pt complains of headache)    [place]:  Phil Kmi is a 26 y.o. female that presents to the emergency department with complaints of nausea and vomiting with associated syncope. The patient's had a history of post emesis syncope. She states she was admitted in January and had a 30-day Holter monitor which showed no abnormalities. Today the patient was feeling sick and vomited once. 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 She states she threw up and then passed out and woke up with her head against the steering wheel. She is having some pain around her left orbit and is concerned because she has a headache associated with her pain. She states normally she does not have a headache. She is complaining of pain around her left orbit. She states her vision is \"slightly blurry\". She took a Zofran and states she still feels slightly nauseated. She had soft stool but no diarrhea. She was recently on a cruise to the [place]. She states there were no obvious illnesses spreading through the cruise when she was on it. She states she has vomited 3 times and is passed out at least twice today. She denies any chest pain associated with her syncope.       ROS - see HPI, below listed is current ROS at time of my eval:  At least 10

## 2023-07-12 NOTE — DISCHARGE INSTRUCTIONS
Drink plenty of clear liquids. Return for any problems or concerns. Follow-up with your primary caregiver soon as possible. Use medications as needed.

## 2023-07-13 ENCOUNTER — OFFICE VISIT (OUTPATIENT)
Dept: INTERNAL MEDICINE CLINIC | Age: 23
End: 2023-07-13
Payer: COMMERCIAL

## 2023-07-13 VITALS
SYSTOLIC BLOOD PRESSURE: 110 MMHG | WEIGHT: 135 LBS | HEART RATE: 93 BPM | OXYGEN SATURATION: 97 % | TEMPERATURE: 98.8 F | DIASTOLIC BLOOD PRESSURE: 72 MMHG | HEIGHT: 65 IN | BODY MASS INDEX: 22.49 KG/M2

## 2023-07-13 DIAGNOSIS — R11.2 NAUSEA AND VOMITING, UNSPECIFIED VOMITING TYPE: Primary | ICD-10-CM

## 2023-07-13 DIAGNOSIS — R50.9 LOW GRADE FEVER: ICD-10-CM

## 2023-07-13 DIAGNOSIS — R19.5 LOOSE STOOLS: ICD-10-CM

## 2023-07-13 PROCEDURE — G8420 CALC BMI NORM PARAMETERS: HCPCS | Performed by: NURSE PRACTITIONER

## 2023-07-13 PROCEDURE — G8427 DOCREV CUR MEDS BY ELIG CLIN: HCPCS | Performed by: NURSE PRACTITIONER

## 2023-07-13 PROCEDURE — 99213 OFFICE O/P EST LOW 20 MIN: CPT | Performed by: NURSE PRACTITIONER

## 2023-07-13 PROCEDURE — 4004F PT TOBACCO SCREEN RCVD TLK: CPT | Performed by: NURSE PRACTITIONER

## 2023-07-13 RX ORDER — ONDANSETRON 4 MG/1
4 TABLET, FILM COATED ORAL
Qty: 20 TABLET | Refills: 0 | Status: SHIPPED | OUTPATIENT
Start: 2023-07-13

## 2023-07-13 NOTE — PATIENT INSTRUCTIONS
Suspicious for viral induced stomach bug given loose stools and low-grade fever in the setting of nausea and vomiting. Rest, push fluids, good hand hygiene. Eat food that tastes good but generally avoid dairy and heavily processed foods. Recommend fever-reducing medication like tylenol or ibuprofen as needed over the next 2-3 days. Zofran OR phenergan every 6-8 hrs as needed for nausea/vomiting to prevent passing out after vomiting episodes. Follow-up if no improvement in symptoms early next week. Sooner for worsening symptoms.

## 2023-11-08 RX ORDER — HYDROXYZINE 50 MG/1
50 TABLET, FILM COATED ORAL EVERY 6 HOURS PRN
Qty: 60 TABLET | Refills: 2 | Status: SHIPPED | OUTPATIENT
Start: 2023-11-08

## 2023-11-08 NOTE — TELEPHONE ENCOUNTER
Pt. States she has been dealing with a lot and has had a death in the family recently and is needing a refill on her anxiety medication hydroxyzine previously prescribed by Dr. Campoverde Persons however she is now under the care of Marce Cruz PA-C.

## 2023-11-16 ENCOUNTER — OFFICE VISIT (OUTPATIENT)
Dept: FAMILY MEDICINE CLINIC | Age: 23
End: 2023-11-16

## 2023-11-16 VITALS
SYSTOLIC BLOOD PRESSURE: 112 MMHG | HEART RATE: 84 BPM | WEIGHT: 138.8 LBS | OXYGEN SATURATION: 99 % | BODY MASS INDEX: 22.31 KG/M2 | HEIGHT: 66 IN | DIASTOLIC BLOOD PRESSURE: 62 MMHG | RESPIRATION RATE: 18 BRPM

## 2023-11-16 DIAGNOSIS — F33.2 SEVERE EPISODE OF RECURRENT MAJOR DEPRESSIVE DISORDER, WITHOUT PSYCHOTIC FEATURES (HCC): Primary | ICD-10-CM

## 2023-11-16 DIAGNOSIS — Z23 FLU VACCINE NEED: ICD-10-CM

## 2023-11-16 DIAGNOSIS — F41.9 ANXIETY: ICD-10-CM

## 2023-11-16 PROBLEM — G40.89 OTHER SEIZURES (HCC): Status: RESOLVED | Noted: 2023-03-24 | Resolved: 2023-11-16

## 2023-11-16 PROBLEM — F43.12 CHRONIC POST-TRAUMATIC STRESS DISORDER (PTSD): Status: ACTIVE | Noted: 2023-11-16

## 2023-11-16 PROBLEM — F23 BRIEF PSYCHOTIC DISORDER (HCC): Status: ACTIVE | Noted: 2023-11-16

## 2023-11-16 PROBLEM — F41.1 GAD (GENERALIZED ANXIETY DISORDER): Status: ACTIVE | Noted: 2023-11-16

## 2023-11-16 PROBLEM — R55 SYNCOPE AND COLLAPSE: Status: RESOLVED | Noted: 2023-01-22 | Resolved: 2023-11-16

## 2023-11-16 RX ORDER — BUSPIRONE HYDROCHLORIDE 5 MG/1
5 TABLET ORAL 3 TIMES DAILY
Qty: 90 TABLET | Refills: 1 | Status: SHIPPED | OUTPATIENT
Start: 2023-11-16

## 2023-11-16 RX ORDER — ONDANSETRON 4 MG/1
4 TABLET, ORALLY DISINTEGRATING ORAL EVERY 8 HOURS PRN
Qty: 20 TABLET | Refills: 0 | Status: SHIPPED | OUTPATIENT
Start: 2023-11-16

## 2023-11-16 RX ORDER — VENLAFAXINE HYDROCHLORIDE 37.5 MG/1
CAPSULE, EXTENDED RELEASE ORAL
Qty: 60 CAPSULE | Refills: 0 | Status: SHIPPED | OUTPATIENT
Start: 2023-11-16

## 2023-11-16 ASSESSMENT — PATIENT HEALTH QUESTIONNAIRE - PHQ9
6. FEELING BAD ABOUT YOURSELF - OR THAT YOU ARE A FAILURE OR HAVE LET YOURSELF OR YOUR FAMILY DOWN: 3
8. MOVING OR SPEAKING SO SLOWLY THAT OTHER PEOPLE COULD HAVE NOTICED. OR THE OPPOSITE, BEING SO FIGETY OR RESTLESS THAT YOU HAVE BEEN MOVING AROUND A LOT MORE THAN USUAL: 0
3. TROUBLE FALLING OR STAYING ASLEEP: 3
7. TROUBLE CONCENTRATING ON THINGS, SUCH AS READING THE NEWSPAPER OR WATCHING TELEVISION: 3
SUM OF ALL RESPONSES TO PHQ QUESTIONS 1-9: 21
10. IF YOU CHECKED OFF ANY PROBLEMS, HOW DIFFICULT HAVE THESE PROBLEMS MADE IT FOR YOU TO DO YOUR WORK, TAKE CARE OF THINGS AT HOME, OR GET ALONG WITH OTHER PEOPLE: 3
1. LITTLE INTEREST OR PLEASURE IN DOING THINGS: 3
SUM OF ALL RESPONSES TO PHQ QUESTIONS 1-9: 21
4. FEELING TIRED OR HAVING LITTLE ENERGY: 3
SUM OF ALL RESPONSES TO PHQ QUESTIONS 1-9: 21
9. THOUGHTS THAT YOU WOULD BE BETTER OFF DEAD, OR OF HURTING YOURSELF: 0
SUM OF ALL RESPONSES TO PHQ QUESTIONS 1-9: 21
SUM OF ALL RESPONSES TO PHQ9 QUESTIONS 1 & 2: 6
2. FEELING DOWN, DEPRESSED OR HOPELESS: 3
5. POOR APPETITE OR OVEREATING: 3

## 2023-11-16 ASSESSMENT — COLUMBIA-SUICIDE SEVERITY RATING SCALE - C-SSRS
3. HAVE YOU BEEN THINKING ABOUT HOW YOU MIGHT KILL YOURSELF?: NO
5. HAVE YOU STARTED TO WORK OUT OR WORKED OUT THE DETAILS OF HOW TO KILL YOURSELF? DO YOU INTEND TO CARRY OUT THIS PLAN?: NO
7. DID THIS OCCUR IN THE LAST THREE MONTHS: NO
1. WITHIN THE PAST MONTH, HAVE YOU WISHED YOU WERE DEAD OR WISHED YOU COULD GO TO SLEEP AND NOT WAKE UP?: NO
6. HAVE YOU EVER DONE ANYTHING, STARTED TO DO ANYTHING, OR PREPARED TO DO ANYTHING TO END YOUR LIFE?: NO
4. HAVE YOU HAD THESE THOUGHTS AND HAD SOME INTENTION OF ACTING ON THEM?: NO

## 2023-11-16 NOTE — PATIENT INSTRUCTIONS
We are committed to providing you the best care possible. If you receive a survey after visiting one of our offices, please take time to share your experience concerning your physician office visit. These surveys are confidential and no health information about you is shared. We are eager to improve for you and continue to give you satisfactory care, we are counting on your feedback to help make that happen. Welcome to [address] [place] [address] and Pediatrics:    Did you know we now have a faster way for you to move through your appointment? For your convenience, we now have digital registration available. When you schedule your next appointment, you will receive a link via your email as well as a text message that will allow you to complete any paperwork digitally before your appointment.

## 2023-11-16 NOTE — PROGRESS NOTES
Vaccine Information Sheet, \"Influenza - Inactivated\"  given to Rosalia Cruz, or parent/legal guardian of  Rosalia Cruz and verbalized understanding. Patient responses:    Have you ever had a reaction to a flu vaccine? No  Do you have any current illness? No  Have you ever had Guillian Nineveh Syndrome? No  Do you have a serious allergy to any of the follow: Neomycin, Polymyxin, Thimerosal, eggs or egg products? No    Flu vaccine given per order. Please see immunization tab. Risks and benefits explained. Current VIS given.
tablet    venlafaxine (EFFEXOR XR) 37.5 MG extended release capsule    busPIRone (BUSPAR) 5 MG tablet    Other Relevant Orders    [facility]ria Epley, [place], Vishal Guerra    External Referral To Behavioral Health            Return in about 7 weeks (around 1/4/2024).

## 2023-11-17 ASSESSMENT — ENCOUNTER SYMPTOMS
RESPIRATORY NEGATIVE: 1
GASTROINTESTINAL NEGATIVE: 1

## 2024-01-02 ENCOUNTER — TELEPHONE (OUTPATIENT)
Dept: FAMILY MEDICINE CLINIC | Age: 24
End: 2024-01-02

## 2024-01-02 RX ORDER — ALBUTEROL SULFATE 90 UG/1
2 AEROSOL, METERED RESPIRATORY (INHALATION) EVERY 6 HOURS PRN
Qty: 1 EACH | Refills: 3 | Status: SHIPPED | OUTPATIENT
Start: 2024-01-02

## 2024-01-02 NOTE — TELEPHONE ENCOUNTER
Patient called to  let provider know patient had tested positive on   12/28/2023   Patient has cough, congestion. Fever and some shortness of breath .   Patient has been taking OTC medication but wondering if provider has any other recommendations.   Please advise

## 2024-01-02 NOTE — TELEPHONE ENCOUNTER
Called patient and unable to reach.   Left voice message for patient to return call to the office

## 2024-01-09 ENCOUNTER — TELEPHONE (OUTPATIENT)
Dept: FAMILY MEDICINE CLINIC | Age: 24
End: 2024-01-09

## 2024-01-09 NOTE — TELEPHONE ENCOUNTER
Called patient and unable to reach.  Left voice message for patient to return call to the office.  Was calling patient to reschedule her appointment patient has on 1/10/2024 at 10 am

## 2024-03-15 ENCOUNTER — OFFICE VISIT (OUTPATIENT)
Age: 24
End: 2024-03-15
Payer: COMMERCIAL

## 2024-03-15 VITALS
HEART RATE: 90 BPM | TEMPERATURE: 98.7 F | DIASTOLIC BLOOD PRESSURE: 60 MMHG | OXYGEN SATURATION: 99 % | WEIGHT: 138.2 LBS | RESPIRATION RATE: 20 BRPM | SYSTOLIC BLOOD PRESSURE: 110 MMHG | BODY MASS INDEX: 22.31 KG/M2

## 2024-03-15 DIAGNOSIS — R10.13 EPIGASTRIC PAIN: Primary | ICD-10-CM

## 2024-03-15 PROCEDURE — 4004F PT TOBACCO SCREEN RCVD TLK: CPT | Performed by: PHYSICIAN ASSISTANT

## 2024-03-15 PROCEDURE — G8427 DOCREV CUR MEDS BY ELIG CLIN: HCPCS | Performed by: PHYSICIAN ASSISTANT

## 2024-03-15 PROCEDURE — 99213 OFFICE O/P EST LOW 20 MIN: CPT | Performed by: PHYSICIAN ASSISTANT

## 2024-03-15 PROCEDURE — G8420 CALC BMI NORM PARAMETERS: HCPCS | Performed by: PHYSICIAN ASSISTANT

## 2024-03-15 PROCEDURE — G8482 FLU IMMUNIZE ORDER/ADMIN: HCPCS | Performed by: PHYSICIAN ASSISTANT

## 2024-03-15 RX ORDER — OMEPRAZOLE 40 MG/1
40 CAPSULE, DELAYED RELEASE ORAL
Qty: 14 CAPSULE | Refills: 0 | Status: SHIPPED | OUTPATIENT
Start: 2024-03-15

## 2024-03-15 ASSESSMENT — ENCOUNTER SYMPTOMS
COLOR CHANGE: 0
CONSTIPATION: 0
EYE REDNESS: 0
WHEEZING: 0
SORE THROAT: 0
EYE DISCHARGE: 0
CHEST TIGHTNESS: 0
DIARRHEA: 0
ABDOMINAL PAIN: 1
VOMITING: 1
EYE PAIN: 0
BLOOD IN STOOL: 0
COUGH: 0
RHINORRHEA: 0
PHOTOPHOBIA: 0
NAUSEA: 1
SHORTNESS OF BREATH: 0
BACK PAIN: 0

## 2024-03-15 NOTE — PROGRESS NOTES
Jackie Corona (:  2000) is a 23 y.o. female,Established patient, here for evaluation of the following chief complaint(s):    ED Follow-up (Severe stomach pain and back pain Feels like pulled muscle in back and stomach x5 days Worse at night time when laying flat )    This is my first patient encounter with Jackie Corona; chart reviewed.     SUBJECTIVE/OBJECTIVE:  HPI  Jackie Corona is a pleasant 23 y.o. female presenting to clinic today for abdominal pain.  Patient reports onset of upper abdominal pain 5 days ago; reports was initially mild and she thought it was menstrual Cramping; reports pain worsened significantly in the following day and she had a few episodes of vomiting.  Reports she had mild fever for the first 2 days.  Reports fever has been absent since then.  Reports she has had constant pain with episodic intense stabbing in epigastric area since then; reports pain is usually worsened after eating.  Was seen in emergency department yesterday at [facility], reports she had a CT done and lab work and urine testing and was given Zofran and IV fluids and discharged and told there was nothing emergent.  Patient reports her symptoms are about the same today however Zofran has helped with the nausea and vomiting.  Patient denies melena, hematemesis, hematochezia or dark tarry stools.  Reports normal bowel movements, states she had a well-formed stool earlier today.  Denies any vaginal discharge or urinary symptoms.            No Known Allergies    Current Outpatient Medications   Medication Sig Dispense Refill    omeprazole (PRILOSEC) 40 MG delayed release capsule Take 1 capsule by mouth every morning (before breakfast) 14 capsule 0    ondansetron (ZOFRAN-ODT) 4 MG disintegrating tablet Take 1 tablet by mouth every 8 hours as needed for Nausea or Vomiting 20 tablet 0    hydrOXYzine HCl (ATARAX) 50 MG tablet Take 1 tablet by mouth every 6 hours as needed for Anxiety 60 tablet 2    ondansetron

## 2024-08-13 NOTE — CARE COORDINATION
Case Management Assessment  Initial Evaluation    Date/Time of Evaluation: 1/23/2023 3:33 PM  Assessment Completed by: ASH Arizmendi    If patient is discharged prior to next notation, then this note serves as note for discharge by case management. Patient Name: Danna Puckett                   YOB: 2000  Diagnosis: Syncope and collapse [R55]                   Date / Time: 1/22/2023  5:34 PM    Patient Admission Status: Observation   Readmission Risk (Low < 19, Mod (19-27), High > 27): Readmission Risk Score: 6.9    Current PCP: Kendrick Ruano MD  PCP verified by CM? Yes    Chart Reviewed: Yes      History Provided by: Medical Record  Patient Orientation: Alert and Oriented    Patient Cognition: Alert    Hospitalization in the last 30 days (Readmission):  Yes    If yes, Readmission Assessment in  Navigator will be completed. Advance Directives:      Code Status: Full Code   Patient's Primary Decision Maker is:        Discharge Planning:    Patient lives with:   Type of Home:    Primary Care Giver: Self  Patient Support Systems include: Family Members   Current Financial resources: Medicaid  Current community resources: None  Current services prior to admission:              Current DME:              Type of Home Care services:       ADLS  Prior functional level: Independent in ADLs/IADLs  Current functional level: Independent in ADLs/IADLs    PT AM-PAC:   /24  OT AM-PAC:   /24    Family can provide assistance at DC: Yes  Would you like Case Management to discuss the discharge plan with any other family members/significant others, and if so, who?     Plans to Return to Present Housing: Yes  Other Identified Issues/Barriers to RETURNING to current housing: none  Potential Assistance needed at discharge:              Potential DME:    Patient expects to discharge to:    Plan for transportation at discharge:      Financial    Payor: Lawanda Chandler / Plan: Marshall Maier / Product Type: *No Will review at upcoming appointment   Product type* /     Does insurance require precert for SNF: Yes    Potential assistance Purchasing Medications:    Meds-to-Beds request: Yes      Chris 21 [phone] Dominik Almazan, [address] [place] [address] [id number]  Phone: [phone] Fax: [phone]      Notes:    Factors facilitating achievement of predicted outcomes: Family support    Barriers to discharge: none    Additional Case Management Notes: Chart reviewed, no needs identified at this time. CM following     The Plan for Transition of Care is related to the following treatment goals of Syncope and collapse [F30]    IF APPLICABLE: The Patient and/or patient representative Jackie and her family were provided with a choice of provider and agrees with the discharge plan. Freedom of choice list with basic dialogue that supports the patient's individualized plan of care/goals and shares the quality data associated with the providers was provided to:     Patient Representative Name:       The Patient and/or Patient Representative Agree with the Discharge Plan?       Charlee Gee [facility]  Case Management Department  Ph: [phone]

## 2024-08-28 ENCOUNTER — OFFICE VISIT (OUTPATIENT)
Age: 24
End: 2024-08-28
Payer: COMMERCIAL

## 2024-08-28 VITALS
RESPIRATION RATE: 17 BRPM | BODY MASS INDEX: 25.1 KG/M2 | OXYGEN SATURATION: 99 % | DIASTOLIC BLOOD PRESSURE: 80 MMHG | HEIGHT: 64 IN | HEART RATE: 103 BPM | WEIGHT: 147 LBS | SYSTOLIC BLOOD PRESSURE: 102 MMHG | TEMPERATURE: 98.6 F

## 2024-08-28 DIAGNOSIS — R11.2 NAUSEA AND VOMITING, UNSPECIFIED VOMITING TYPE: ICD-10-CM

## 2024-08-28 DIAGNOSIS — S06.0X0A CONCUSSION WITHOUT LOSS OF CONSCIOUSNESS, INITIAL ENCOUNTER: Primary | ICD-10-CM

## 2024-08-28 PROCEDURE — G8427 DOCREV CUR MEDS BY ELIG CLIN: HCPCS | Performed by: PHYSICIAN ASSISTANT

## 2024-08-28 PROCEDURE — G8419 CALC BMI OUT NRM PARAM NOF/U: HCPCS | Performed by: PHYSICIAN ASSISTANT

## 2024-08-28 PROCEDURE — 4004F PT TOBACCO SCREEN RCVD TLK: CPT | Performed by: PHYSICIAN ASSISTANT

## 2024-08-28 PROCEDURE — 99213 OFFICE O/P EST LOW 20 MIN: CPT | Performed by: PHYSICIAN ASSISTANT

## 2024-08-28 RX ORDER — ONDANSETRON 4 MG/1
4 TABLET, ORALLY DISINTEGRATING ORAL EVERY 8 HOURS PRN
Qty: 30 TABLET | Refills: 0 | Status: SHIPPED | OUTPATIENT
Start: 2024-08-28

## 2024-08-28 RX ORDER — ONDANSETRON 4 MG/1
4 TABLET, FILM COATED ORAL
Qty: 20 TABLET | Refills: 0 | Status: CANCELLED | OUTPATIENT
Start: 2024-08-28

## 2024-08-28 ASSESSMENT — ENCOUNTER SYMPTOMS
SORE THROAT: 0
EYE DISCHARGE: 0
CONSTIPATION: 0
BLOOD IN STOOL: 0
BACK PAIN: 0
PHOTOPHOBIA: 0
WHEEZING: 0
CHEST TIGHTNESS: 0
EYE REDNESS: 0
NAUSEA: 1
ABDOMINAL PAIN: 0
EYE PAIN: 0
VOMITING: 1
COLOR CHANGE: 0
DIARRHEA: 0
RHINORRHEA: 0
SHORTNESS OF BREATH: 0
COUGH: 0

## 2024-08-28 NOTE — PROGRESS NOTES
Jackie Corona (:  2000) is a 24 y.o. female,Established patient, here for evaluation of the following chief complaint(s):    Head Injury (Pt states she hit her head Friday night, when pt looks L or R she gets tunnel vision/Still has nausea and has vomited a few times)      SUBJECTIVE/OBJECTIVE:  HPI  Jackie Corona is a pleasant 24 y.o. female presenting to clinic today for head injury.  Patient reports last Friday evening she had a few shots of vodka with red bull and was crawling around on the floor with her dog when she bumped to the right temporal area of her head against the corner of the door frame; denies loss of consciousness; reports she noticed a lump to forehead with somewhat immediate pain; reports she stopped drinking and went to bed; reports that the following day she had pain to areas of trauma which has been persistent since; reports she has 2 episodes of vomiting which is not projectile with mild persistent nausea.  Denies hematemesis or melena.  Reports she has been taking ibuprofen and Tylenol which did not help with the pain.  Patient denies vision changes or dizziness, reports mild sensation of imbalance without lightheadedness or presyncope.  He is in school and reports some trouble concentrating and fatigue.  Reports difficulty sleeping for the past few nights with nighttime awakenings, states she is able to fall back asleep.  Denies nasal or otic discharge.    No Known Allergies    Current Outpatient Medications   Medication Sig Dispense Refill    ondansetron (ZOFRAN-ODT) 4 MG disintegrating tablet Take 1 tablet by mouth every 8 hours as needed for Nausea or Vomiting 30 tablet 0    hydrOXYzine HCl (ATARAX) 50 MG tablet Take 1 tablet by mouth every 6 hours as needed for Anxiety 60 tablet 2    ondansetron (ZOFRAN) 4 MG tablet Take 1 tablet by mouth every 6-8 hours as needed for Nausea or Vomiting 20 tablet 0    omeprazole (PRILOSEC) 40 MG delayed release capsule Take 1 capsule by

## 2024-09-06 ENCOUNTER — NURSE ONLY (OUTPATIENT)
Dept: FAMILY MEDICINE CLINIC | Age: 24
End: 2024-09-06
Payer: COMMERCIAL

## 2024-09-06 DIAGNOSIS — Z11.1 TUBERCULOSIS SCREENING: Primary | ICD-10-CM

## 2024-09-06 PROCEDURE — 36415 COLL VENOUS BLD VENIPUNCTURE: CPT | Performed by: NURSE PRACTITIONER

## 2024-09-11 LAB
GAMMA INTERFERON BACKGROUND BLD IA-ACNC: 0.04 IU/ML
M TB IFN-G BLD-IMP: NEGATIVE
M TB IFN-G CD4+ BCKGRND COR BLD-ACNC: 0.01 IU/ML
M TB IFN-G CD4+CD8+ BCKGRND COR BLD-ACNC: 0 IU/ML
MITOGEN IGNF BCKGRD COR BLD-ACNC: >10 IU/ML

## 2025-03-03 ENCOUNTER — OFFICE VISIT (OUTPATIENT)
Age: 25
End: 2025-03-03

## 2025-03-03 VITALS — HEART RATE: 90 BPM | SYSTOLIC BLOOD PRESSURE: 124 MMHG | DIASTOLIC BLOOD PRESSURE: 78 MMHG | OXYGEN SATURATION: 98 %

## 2025-03-03 DIAGNOSIS — Z13.29 THYROID DISORDER SCREEN: ICD-10-CM

## 2025-03-03 DIAGNOSIS — Z84.2 FAMILY HISTORY OF PCOS: ICD-10-CM

## 2025-03-03 DIAGNOSIS — N91.2 AMENORRHEA: Primary | ICD-10-CM

## 2025-03-03 LAB
CONTROL: NORMAL
PREGNANCY TEST URINE, POC: NEGATIVE

## 2025-03-03 PROCEDURE — 81025 URINE PREGNANCY TEST: CPT | Performed by: PHYSICIAN ASSISTANT

## 2025-03-03 PROCEDURE — 99214 OFFICE O/P EST MOD 30 MIN: CPT | Performed by: PHYSICIAN ASSISTANT

## 2025-03-03 SDOH — ECONOMIC STABILITY: FOOD INSECURITY: WITHIN THE PAST 12 MONTHS, YOU WORRIED THAT YOUR FOOD WOULD RUN OUT BEFORE YOU GOT MONEY TO BUY MORE.: NEVER TRUE

## 2025-03-03 SDOH — ECONOMIC STABILITY: FOOD INSECURITY: WITHIN THE PAST 12 MONTHS, THE FOOD YOU BOUGHT JUST DIDN'T LAST AND YOU DIDN'T HAVE MONEY TO GET MORE.: NEVER TRUE

## 2025-03-03 ASSESSMENT — ENCOUNTER SYMPTOMS
COUGH: 0
EYE REDNESS: 0
PHOTOPHOBIA: 0
SHORTNESS OF BREATH: 0
VOMITING: 1
COLOR CHANGE: 0
WHEEZING: 0
NAUSEA: 1
CHEST TIGHTNESS: 0
EYE DISCHARGE: 0
BACK PAIN: 0
BLOOD IN STOOL: 0
ABDOMINAL PAIN: 1
RHINORRHEA: 0
SORE THROAT: 0
CONSTIPATION: 1
EYE PAIN: 0
DIARRHEA: 0

## 2025-03-03 NOTE — PROGRESS NOTES
Jackie Corona (:  2000) is a 24 y.o. female,Established patient, here for evaluation of the following chief complaint(s):    Other (Pt has missed 2 periods. Pt also has been emotional, nauseous. Problems sleeping, fatigue, and feels bloated. )    ASSESSMENT/PLAN:  Assessment & Plan  Various symptoms of unclear etiology at this time, multiple urine pregnancy tests at home negative, urine pregnancy negative in office today.  Check labs including hCG; patient does have family history of thyroid issues and PCOS.  Check associated labs.  Can determine appropriate further management pending test results.  Referral placed to OB/GYN for appropriate follow-up.    1. Amenorrhea  -     POCT urine pregnancy  -     HCG, QUANTITATIVE, PREGNANCY; Future  -     CBC with Auto Differential; Future  -     TSH reflex to FT4, FT3; Future  -     Hemoglobin A1C; Future  -     Testosterone, free, total; Future  -     [facility] - Shabana Sorensen, , OB-GYN, [place]  -     Comprehensive Metabolic Panel; Future  2. Family history of PCOS  -     Hemoglobin A1C; Future  -     Testosterone, free, total; Future  3. Thyroid disorder screen  -     TSH reflex to FT4, FT3; Future      No follow-ups on file.      SUBJECTIVE/OBJECTIVE:  HPI  History of Present Illness  Jackie Corona is a pleasant 24 y.o. female presenting to clinic today for menstrual problem.  Patient reports last period was over 2 months ago.  States previously she had normal menstrual cycle once a month.  Reports she is currently sexually active in a monogamous relationship, not using protection or birth control.  States over the past 2 months she has developed more bloating and constipation, breast engorgement without tenderness, clear mucousy vaginal discharge.  States she has also had nausea and few episodes of vomiting and food aversion to meat.  States she has been more emotional recently also.       No Known Allergies    Current Outpatient Medications   Medication Sig